# Patient Record
Sex: FEMALE | Race: WHITE | NOT HISPANIC OR LATINO | Employment: FULL TIME | URBAN - METROPOLITAN AREA
[De-identification: names, ages, dates, MRNs, and addresses within clinical notes are randomized per-mention and may not be internally consistent; named-entity substitution may affect disease eponyms.]

---

## 2014-10-15 LAB — PAP SMEAR - HIM PATIENT REPORTED: NORMAL

## 2017-01-09 ENCOUNTER — COMMUNICATION - HEALTHEAST (OUTPATIENT)
Dept: INTERNAL MEDICINE | Facility: CLINIC | Age: 27
End: 2017-01-09

## 2017-01-09 DIAGNOSIS — F90.9 ADHD (ATTENTION DEFICIT HYPERACTIVITY DISORDER): ICD-10-CM

## 2017-02-16 ENCOUNTER — COMMUNICATION - HEALTHEAST (OUTPATIENT)
Dept: INTERNAL MEDICINE | Facility: CLINIC | Age: 27
End: 2017-02-16

## 2017-02-16 DIAGNOSIS — F90.9 ADHD (ATTENTION DEFICIT HYPERACTIVITY DISORDER): ICD-10-CM

## 2017-02-27 ENCOUNTER — RECORDS - HEALTHEAST (OUTPATIENT)
Dept: ADMINISTRATIVE | Facility: OTHER | Age: 27
End: 2017-02-27

## 2017-03-27 ENCOUNTER — COMMUNICATION - HEALTHEAST (OUTPATIENT)
Dept: INTERNAL MEDICINE | Facility: CLINIC | Age: 27
End: 2017-03-27

## 2017-03-27 DIAGNOSIS — F90.9 ADHD (ATTENTION DEFICIT HYPERACTIVITY DISORDER): ICD-10-CM

## 2017-04-06 ENCOUNTER — COMMUNICATION - HEALTHEAST (OUTPATIENT)
Dept: INTERNAL MEDICINE | Facility: CLINIC | Age: 27
End: 2017-04-06

## 2017-04-06 DIAGNOSIS — F90.9 ADHD (ATTENTION DEFICIT HYPERACTIVITY DISORDER): ICD-10-CM

## 2017-05-01 ENCOUNTER — COMMUNICATION - HEALTHEAST (OUTPATIENT)
Dept: INTERNAL MEDICINE | Facility: CLINIC | Age: 27
End: 2017-05-01

## 2017-05-01 DIAGNOSIS — F90.9 ADHD (ATTENTION DEFICIT HYPERACTIVITY DISORDER): ICD-10-CM

## 2017-05-30 ENCOUNTER — COMMUNICATION - HEALTHEAST (OUTPATIENT)
Dept: INTERNAL MEDICINE | Facility: CLINIC | Age: 27
End: 2017-05-30

## 2017-05-30 DIAGNOSIS — F90.9 ADHD (ATTENTION DEFICIT HYPERACTIVITY DISORDER): ICD-10-CM

## 2017-06-05 ENCOUNTER — RECORDS - HEALTHEAST (OUTPATIENT)
Dept: ADMINISTRATIVE | Facility: OTHER | Age: 27
End: 2017-06-05

## 2017-06-08 ENCOUNTER — RECORDS - HEALTHEAST (OUTPATIENT)
Dept: ADMINISTRATIVE | Facility: OTHER | Age: 27
End: 2017-06-08

## 2017-06-27 ENCOUNTER — COMMUNICATION - HEALTHEAST (OUTPATIENT)
Dept: INTERNAL MEDICINE | Facility: CLINIC | Age: 27
End: 2017-06-27

## 2017-06-27 DIAGNOSIS — F90.9 ADHD (ATTENTION DEFICIT HYPERACTIVITY DISORDER): ICD-10-CM

## 2017-06-30 ENCOUNTER — COMMUNICATION - HEALTHEAST (OUTPATIENT)
Dept: INTERNAL MEDICINE | Facility: CLINIC | Age: 27
End: 2017-06-30

## 2017-07-25 ENCOUNTER — HOSPITAL ENCOUNTER (OUTPATIENT)
Dept: MRI IMAGING | Facility: HOSPITAL | Age: 27
Discharge: HOME | End: 2017-07-25
Attending: INTERNAL MEDICINE

## 2017-07-25 ENCOUNTER — OFFICE VISIT - HEALTHEAST (OUTPATIENT)
Dept: INTERNAL MEDICINE | Facility: CLINIC | Age: 27
End: 2017-07-25

## 2017-07-25 DIAGNOSIS — R42 VERTIGO: ICD-10-CM

## 2017-07-25 DIAGNOSIS — R42 LIGHTHEADED: ICD-10-CM

## 2017-07-25 DIAGNOSIS — Z85.818 HISTORY OF PAROTID CANCER: ICD-10-CM

## 2017-07-26 ENCOUNTER — COMMUNICATION - HEALTHEAST (OUTPATIENT)
Dept: INTERNAL MEDICINE | Facility: CLINIC | Age: 27
End: 2017-07-26

## 2017-07-28 ENCOUNTER — RECORDS - HEALTHEAST (OUTPATIENT)
Dept: ADMINISTRATIVE | Facility: OTHER | Age: 27
End: 2017-07-28

## 2017-08-09 ENCOUNTER — COMMUNICATION - HEALTHEAST (OUTPATIENT)
Dept: INTERNAL MEDICINE | Facility: CLINIC | Age: 27
End: 2017-08-09

## 2017-08-09 DIAGNOSIS — F90.9 ADHD (ATTENTION DEFICIT HYPERACTIVITY DISORDER): ICD-10-CM

## 2017-09-13 ENCOUNTER — COMMUNICATION - HEALTHEAST (OUTPATIENT)
Dept: INTERNAL MEDICINE | Facility: CLINIC | Age: 27
End: 2017-09-13

## 2017-09-13 DIAGNOSIS — F90.9 ADHD (ATTENTION DEFICIT HYPERACTIVITY DISORDER): ICD-10-CM

## 2017-10-11 ENCOUNTER — COMMUNICATION - HEALTHEAST (OUTPATIENT)
Dept: INTERNAL MEDICINE | Facility: CLINIC | Age: 27
End: 2017-10-11

## 2017-10-11 DIAGNOSIS — F90.9 ADHD (ATTENTION DEFICIT HYPERACTIVITY DISORDER): ICD-10-CM

## 2017-10-16 ENCOUNTER — COMMUNICATION - HEALTHEAST (OUTPATIENT)
Dept: SCHEDULING | Facility: CLINIC | Age: 27
End: 2017-10-16

## 2017-10-16 ENCOUNTER — OFFICE VISIT - HEALTHEAST (OUTPATIENT)
Dept: INTERNAL MEDICINE | Facility: CLINIC | Age: 27
End: 2017-10-16

## 2017-10-16 DIAGNOSIS — J06.9 URI (UPPER RESPIRATORY INFECTION): ICD-10-CM

## 2017-11-13 ENCOUNTER — COMMUNICATION - HEALTHEAST (OUTPATIENT)
Dept: INTERNAL MEDICINE | Facility: CLINIC | Age: 27
End: 2017-11-13

## 2017-11-13 DIAGNOSIS — F90.9 ADHD (ATTENTION DEFICIT HYPERACTIVITY DISORDER): ICD-10-CM

## 2017-11-15 ENCOUNTER — RECORDS - HEALTHEAST (OUTPATIENT)
Dept: ADMINISTRATIVE | Facility: OTHER | Age: 27
End: 2017-11-15

## 2017-11-16 ENCOUNTER — RECORDS - HEALTHEAST (OUTPATIENT)
Dept: ADMINISTRATIVE | Facility: OTHER | Age: 27
End: 2017-11-16

## 2017-11-16 LAB — PAP SMEAR - HIM PATIENT REPORTED: NORMAL

## 2017-12-12 ENCOUNTER — COMMUNICATION - HEALTHEAST (OUTPATIENT)
Dept: INTERNAL MEDICINE | Facility: CLINIC | Age: 27
End: 2017-12-12

## 2017-12-12 DIAGNOSIS — F90.9 ADHD (ATTENTION DEFICIT HYPERACTIVITY DISORDER): ICD-10-CM

## 2018-01-16 ENCOUNTER — COMMUNICATION - HEALTHEAST (OUTPATIENT)
Dept: INTERNAL MEDICINE | Facility: CLINIC | Age: 28
End: 2018-01-16

## 2018-01-16 DIAGNOSIS — F90.9 ADHD (ATTENTION DEFICIT HYPERACTIVITY DISORDER): ICD-10-CM

## 2018-01-29 ENCOUNTER — COMMUNICATION - HEALTHEAST (OUTPATIENT)
Dept: SCHEDULING | Facility: CLINIC | Age: 28
End: 2018-01-29

## 2018-01-29 DIAGNOSIS — J11.1 INFLUENZA: ICD-10-CM

## 2018-02-15 ENCOUNTER — COMMUNICATION - HEALTHEAST (OUTPATIENT)
Dept: INTERNAL MEDICINE | Facility: CLINIC | Age: 28
End: 2018-02-15

## 2018-02-15 DIAGNOSIS — F90.9 ADHD (ATTENTION DEFICIT HYPERACTIVITY DISORDER): ICD-10-CM

## 2018-03-12 ENCOUNTER — AMBULATORY - HEALTHEAST (OUTPATIENT)
Dept: INTERNAL MEDICINE | Facility: CLINIC | Age: 28
End: 2018-03-12

## 2018-03-12 ENCOUNTER — COMMUNICATION - HEALTHEAST (OUTPATIENT)
Dept: INTERNAL MEDICINE | Facility: CLINIC | Age: 28
End: 2018-03-12

## 2018-03-12 DIAGNOSIS — F90.9 ADHD (ATTENTION DEFICIT HYPERACTIVITY DISORDER): ICD-10-CM

## 2018-03-19 ENCOUNTER — OFFICE VISIT - HEALTHEAST (OUTPATIENT)
Dept: INTERNAL MEDICINE | Facility: CLINIC | Age: 28
End: 2018-03-19

## 2018-03-19 DIAGNOSIS — F90.9 ADHD (ATTENTION DEFICIT HYPERACTIVITY DISORDER): ICD-10-CM

## 2018-03-19 ASSESSMENT — MIFFLIN-ST. JEOR: SCORE: 1396.74

## 2018-04-18 ENCOUNTER — COMMUNICATION - HEALTHEAST (OUTPATIENT)
Dept: INTERNAL MEDICINE | Facility: CLINIC | Age: 28
End: 2018-04-18

## 2018-05-18 ENCOUNTER — COMMUNICATION - HEALTHEAST (OUTPATIENT)
Dept: INTERNAL MEDICINE | Facility: CLINIC | Age: 28
End: 2018-05-18

## 2018-06-18 ENCOUNTER — COMMUNICATION - HEALTHEAST (OUTPATIENT)
Dept: INTERNAL MEDICINE | Facility: CLINIC | Age: 28
End: 2018-06-18

## 2018-07-12 ENCOUNTER — COMMUNICATION - HEALTHEAST (OUTPATIENT)
Dept: INTERNAL MEDICINE | Facility: CLINIC | Age: 28
End: 2018-07-12

## 2018-08-14 ENCOUNTER — COMMUNICATION - HEALTHEAST (OUTPATIENT)
Dept: INTERNAL MEDICINE | Facility: CLINIC | Age: 28
End: 2018-08-14

## 2018-08-23 ENCOUNTER — COMMUNICATION - HEALTHEAST (OUTPATIENT)
Dept: SCHEDULING | Facility: CLINIC | Age: 28
End: 2018-08-23

## 2018-08-23 ENCOUNTER — OFFICE VISIT - HEALTHEAST (OUTPATIENT)
Dept: INTERNAL MEDICINE | Facility: CLINIC | Age: 28
End: 2018-08-23

## 2018-08-23 DIAGNOSIS — H81.13 BENIGN PAROXYSMAL POSITIONAL VERTIGO, BILATERAL: ICD-10-CM

## 2018-08-23 ASSESSMENT — MIFFLIN-ST. JEOR: SCORE: 1364.98

## 2018-08-24 ENCOUNTER — RECORDS - HEALTHEAST (OUTPATIENT)
Dept: ADMINISTRATIVE | Facility: OTHER | Age: 28
End: 2018-08-24

## 2018-08-27 ENCOUNTER — RECORDS - HEALTHEAST (OUTPATIENT)
Dept: ADMINISTRATIVE | Facility: OTHER | Age: 28
End: 2018-08-27

## 2018-09-12 ENCOUNTER — COMMUNICATION - HEALTHEAST (OUTPATIENT)
Dept: INTERNAL MEDICINE | Facility: CLINIC | Age: 28
End: 2018-09-12

## 2018-10-15 ENCOUNTER — COMMUNICATION - HEALTHEAST (OUTPATIENT)
Dept: INTERNAL MEDICINE | Facility: CLINIC | Age: 28
End: 2018-10-15

## 2018-11-01 ENCOUNTER — RECORDS - HEALTHEAST (OUTPATIENT)
Dept: ADMINISTRATIVE | Facility: OTHER | Age: 28
End: 2018-11-01

## 2018-11-06 ENCOUNTER — RECORDS - HEALTHEAST (OUTPATIENT)
Dept: ADMINISTRATIVE | Facility: OTHER | Age: 28
End: 2018-11-06

## 2018-11-12 ENCOUNTER — COMMUNICATION - HEALTHEAST (OUTPATIENT)
Dept: INTERNAL MEDICINE | Facility: CLINIC | Age: 28
End: 2018-11-12

## 2018-11-19 ENCOUNTER — OFFICE VISIT - HEALTHEAST (OUTPATIENT)
Dept: INTERNAL MEDICINE | Facility: CLINIC | Age: 28
End: 2018-11-19

## 2018-11-19 DIAGNOSIS — J40 BRONCHITIS: ICD-10-CM

## 2018-11-19 ASSESSMENT — MIFFLIN-ST. JEOR: SCORE: 1378.59

## 2018-11-29 ENCOUNTER — COMMUNICATION - HEALTHEAST (OUTPATIENT)
Dept: INTERNAL MEDICINE | Facility: CLINIC | Age: 28
End: 2018-11-29

## 2018-12-13 ENCOUNTER — COMMUNICATION - HEALTHEAST (OUTPATIENT)
Dept: INTERNAL MEDICINE | Facility: CLINIC | Age: 28
End: 2018-12-13

## 2019-01-18 ENCOUNTER — COMMUNICATION - HEALTHEAST (OUTPATIENT)
Dept: INTERNAL MEDICINE | Facility: CLINIC | Age: 29
End: 2019-01-18

## 2019-02-20 ENCOUNTER — COMMUNICATION - HEALTHEAST (OUTPATIENT)
Dept: INTERNAL MEDICINE | Facility: CLINIC | Age: 29
End: 2019-02-20

## 2019-03-19 ENCOUNTER — COMMUNICATION - HEALTHEAST (OUTPATIENT)
Dept: INTERNAL MEDICINE | Facility: CLINIC | Age: 29
End: 2019-03-19

## 2019-04-22 ENCOUNTER — COMMUNICATION - HEALTHEAST (OUTPATIENT)
Dept: INTERNAL MEDICINE | Facility: CLINIC | Age: 29
End: 2019-04-22

## 2019-04-22 DIAGNOSIS — F90.9 ATTENTION DEFICIT HYPERACTIVITY DISORDER (ADHD), UNSPECIFIED ADHD TYPE: ICD-10-CM

## 2019-05-23 ENCOUNTER — COMMUNICATION - HEALTHEAST (OUTPATIENT)
Dept: INTERNAL MEDICINE | Facility: CLINIC | Age: 29
End: 2019-05-23

## 2019-05-23 DIAGNOSIS — F90.9 ATTENTION DEFICIT HYPERACTIVITY DISORDER (ADHD), UNSPECIFIED ADHD TYPE: ICD-10-CM

## 2019-06-06 ENCOUNTER — OFFICE VISIT - HEALTHEAST (OUTPATIENT)
Dept: INTERNAL MEDICINE | Facility: CLINIC | Age: 29
End: 2019-06-06

## 2019-06-06 DIAGNOSIS — J40 BRONCHITIS: ICD-10-CM

## 2019-06-14 ENCOUNTER — RECORDS - HEALTHEAST (OUTPATIENT)
Dept: HEALTH INFORMATION MANAGEMENT | Facility: CLINIC | Age: 29
End: 2019-06-14

## 2019-06-17 ENCOUNTER — COMMUNICATION - HEALTHEAST (OUTPATIENT)
Dept: INTERNAL MEDICINE | Facility: CLINIC | Age: 29
End: 2019-06-17

## 2019-06-17 DIAGNOSIS — F90.9 ATTENTION DEFICIT HYPERACTIVITY DISORDER (ADHD), UNSPECIFIED ADHD TYPE: ICD-10-CM

## 2019-07-01 ENCOUNTER — OFFICE VISIT - HEALTHEAST (OUTPATIENT)
Dept: INTERNAL MEDICINE | Facility: CLINIC | Age: 29
End: 2019-07-01

## 2019-07-01 DIAGNOSIS — H65.03 BILATERAL ACUTE SEROUS OTITIS MEDIA, RECURRENCE NOT SPECIFIED: ICD-10-CM

## 2019-07-01 DIAGNOSIS — R07.0 THROAT PAIN: ICD-10-CM

## 2019-07-01 LAB — DEPRECATED S PYO AG THROAT QL EIA: NORMAL

## 2019-07-02 LAB — GROUP A STREP BY PCR: NORMAL

## 2019-07-15 ENCOUNTER — COMMUNICATION - HEALTHEAST (OUTPATIENT)
Dept: INTERNAL MEDICINE | Facility: CLINIC | Age: 29
End: 2019-07-15

## 2019-07-15 DIAGNOSIS — F90.9 ATTENTION DEFICIT HYPERACTIVITY DISORDER (ADHD), UNSPECIFIED ADHD TYPE: ICD-10-CM

## 2019-08-12 ENCOUNTER — COMMUNICATION - HEALTHEAST (OUTPATIENT)
Dept: INTERNAL MEDICINE | Facility: CLINIC | Age: 29
End: 2019-08-12

## 2019-08-12 DIAGNOSIS — F90.9 ATTENTION DEFICIT HYPERACTIVITY DISORDER (ADHD), UNSPECIFIED ADHD TYPE: ICD-10-CM

## 2019-09-11 ENCOUNTER — COMMUNICATION - HEALTHEAST (OUTPATIENT)
Dept: INTERNAL MEDICINE | Facility: CLINIC | Age: 29
End: 2019-09-11

## 2019-09-11 DIAGNOSIS — F90.9 ATTENTION DEFICIT HYPERACTIVITY DISORDER (ADHD), UNSPECIFIED ADHD TYPE: ICD-10-CM

## 2019-09-19 ENCOUNTER — OFFICE VISIT - HEALTHEAST (OUTPATIENT)
Dept: INTERNAL MEDICINE | Facility: CLINIC | Age: 29
End: 2019-09-19

## 2019-09-19 DIAGNOSIS — B00.9 RECURRENT HERPES SIMPLEX: ICD-10-CM

## 2019-09-19 DIAGNOSIS — F90.9 ATTENTION DEFICIT HYPERACTIVITY DISORDER (ADHD), UNSPECIFIED ADHD TYPE: ICD-10-CM

## 2019-09-19 ASSESSMENT — MIFFLIN-ST. JEOR: SCORE: 1387.66

## 2019-10-21 ENCOUNTER — COMMUNICATION - HEALTHEAST (OUTPATIENT)
Dept: INTERNAL MEDICINE | Facility: CLINIC | Age: 29
End: 2019-10-21

## 2019-10-21 DIAGNOSIS — F90.9 ATTENTION DEFICIT HYPERACTIVITY DISORDER (ADHD), UNSPECIFIED ADHD TYPE: ICD-10-CM

## 2019-11-21 ENCOUNTER — COMMUNICATION - HEALTHEAST (OUTPATIENT)
Dept: INTERNAL MEDICINE | Facility: CLINIC | Age: 29
End: 2019-11-21

## 2019-11-21 DIAGNOSIS — F90.9 ATTENTION DEFICIT HYPERACTIVITY DISORDER (ADHD), UNSPECIFIED ADHD TYPE: ICD-10-CM

## 2019-12-23 ENCOUNTER — COMMUNICATION - HEALTHEAST (OUTPATIENT)
Dept: INTERNAL MEDICINE | Facility: CLINIC | Age: 29
End: 2019-12-23

## 2019-12-23 DIAGNOSIS — F90.9 ATTENTION DEFICIT HYPERACTIVITY DISORDER (ADHD), UNSPECIFIED ADHD TYPE: ICD-10-CM

## 2020-01-20 ENCOUNTER — COMMUNICATION - HEALTHEAST (OUTPATIENT)
Dept: INTERNAL MEDICINE | Facility: CLINIC | Age: 30
End: 2020-01-20

## 2020-01-20 DIAGNOSIS — F90.9 ATTENTION DEFICIT HYPERACTIVITY DISORDER (ADHD), UNSPECIFIED ADHD TYPE: ICD-10-CM

## 2020-02-18 ENCOUNTER — COMMUNICATION - HEALTHEAST (OUTPATIENT)
Dept: INTERNAL MEDICINE | Facility: CLINIC | Age: 30
End: 2020-02-18

## 2020-02-18 DIAGNOSIS — F90.9 ATTENTION DEFICIT HYPERACTIVITY DISORDER (ADHD), UNSPECIFIED ADHD TYPE: ICD-10-CM

## 2020-02-28 ENCOUNTER — AMBULATORY - HEALTHEAST (OUTPATIENT)
Dept: INTERNAL MEDICINE | Facility: CLINIC | Age: 30
End: 2020-02-28

## 2020-02-28 ENCOUNTER — COMMUNICATION - HEALTHEAST (OUTPATIENT)
Dept: SCHEDULING | Facility: CLINIC | Age: 30
End: 2020-02-28

## 2020-02-28 ENCOUNTER — OFFICE VISIT - HEALTHEAST (OUTPATIENT)
Dept: INTERNAL MEDICINE | Facility: CLINIC | Age: 30
End: 2020-02-28

## 2020-02-28 DIAGNOSIS — N39.0 URINARY TRACT INFECTION WITHOUT HEMATURIA, SITE UNSPECIFIED: ICD-10-CM

## 2020-02-28 DIAGNOSIS — R19.7 ACUTE DIARRHEA: ICD-10-CM

## 2020-02-28 DIAGNOSIS — R39.9 UTI SYMPTOMS: ICD-10-CM

## 2020-02-28 LAB
ALBUMIN UR-MCNC: NEGATIVE MG/DL
APPEARANCE UR: CLEAR
BILIRUB UR QL STRIP: NEGATIVE
COLOR UR AUTO: YELLOW
GLUCOSE UR STRIP-MCNC: NEGATIVE MG/DL
HGB UR QL STRIP: NEGATIVE
KETONES UR STRIP-MCNC: NEGATIVE MG/DL
LEUKOCYTE ESTERASE UR QL STRIP: NEGATIVE
NITRATE UR QL: NEGATIVE
PH UR STRIP: 5.5 [PH] (ref 5–8)
SP GR UR STRIP: <=1.005 (ref 1–1.03)
UROBILINOGEN UR STRIP-ACNC: NORMAL

## 2020-02-28 ASSESSMENT — MIFFLIN-ST. JEOR: SCORE: 1387.66

## 2020-03-16 ENCOUNTER — COMMUNICATION - HEALTHEAST (OUTPATIENT)
Dept: INTERNAL MEDICINE | Facility: CLINIC | Age: 30
End: 2020-03-16

## 2020-03-16 DIAGNOSIS — F90.9 ATTENTION DEFICIT HYPERACTIVITY DISORDER (ADHD), UNSPECIFIED ADHD TYPE: ICD-10-CM

## 2020-04-07 ENCOUNTER — VIRTUAL VISIT (OUTPATIENT)
Dept: FAMILY MEDICINE | Facility: OTHER | Age: 30
End: 2020-04-07

## 2020-04-07 NOTE — PROGRESS NOTES
"Date: 2020 13:22:14  Clinician: Bethany Linares  Clinician NPI: [id number]  Patient: Milka Jules  Patient : 1990  Patient Address: [address] [place] [address]  Patient Phone: [phone]  Visit Protocol: URI  Patient Summary:  Milka is a 29 year old ( : 1990 ) female who initiated a Visit for COVID-19 (Coronavirus) evaluation and screening. When asked the question \"Please sign me up to receive news, health information and promotions from [employer].\", Milka responded \"No\".    Milka states her symptoms started gradually 3-6 days ago. After her symptoms started, they improved and then got worse again.   Her symptoms consist of facial pain or pressure, myalgia, a cough, malaise, ear pain, chills, and a headache. She is experiencing mild difficulty breathing with activities but can speak normally in full sentences. Milka also feels feverish but was unable to measure her temperature.   Symptom details     Cough: Milka coughs a few times an hour and her cough is more bothersome at night. Phlegm does not come into her throat when she coughs. She believes her cough is caused by post-nasal drip.     Facial pain or pressure: The facial pain or pressure feels worse when bending over or leaning forward.     Headache: She states the headache is moderate (4-6 on a 10 point pain scale).      Milka denies having rhinitis, sore throat, nasal congestion, diarrhea, vomiting, nausea, teeth pain, and wheezing. She also denies taking antibiotic medication for the symptoms, having recent facial or sinus surgery in the past 60 days, and having a sinus infection within the past year.   Precipitating events  She has not recently been exposed to someone with influenza. Milka has not been in close contact with any high risk individuals.   Pertinent COVID-19 (Coronavirus) information  Milka has not traveled internationally or to the areas where COVID-19 (Coronavirus) is " widespread, including cruise ship travel in the last 14 days before the start of her symptoms.   Milka has not had a close contact with a laboratory-confirmed COVID-19 patient within 14 days of symptom onset. She also has not had a close contact with a suspected COVID-19 patient within 14 days of symptom onset.   Milka does not work or volunteer as healthcare worker or a  and does not work or volunteer in a healthcare facility. She does not live with a healthcare worker.   Pertinent medical history  Milka does not get yeast infections when she takes antibiotics.   Milka does not need a return to work/school note.   Weight: 125 lbs   Milka does not smoke or use smokeless tobacco.   She denies pregnancy and denies breastfeeding. She has menstruated in the past month.   Additional information as reported by the patient (free text): Chills, chest pressure, shortness breath, headache, fatigue.   Weight: 125 lbs    MEDICATIONS: No current medications, ALLERGIES: NKDA  Clinician Response:  Dear Milka,   Based on the information you have provided, you do have symptoms that are consistent with Coronavirus (COVID-19).  The coronavirus causes mild to severe respiratory illness with the most common symptoms including fever, cough and difficulty breathing. Unfortunately, many viruses cause similar symptoms and it can be difficult to distinguish between viruses, especially in mild cases, so we are presuming that anyone with cough or fever has coronavirus at this time.  Coronavirus/COVID-19 has reached the point of community spread in [place], meaning that we are finding the virus in people with no known exposure risk for nu the virus. Given the increasing commonness of coronavirus in the community we are no longer testing patients who are not critically ill.  If you are a health care worker, you should refer to your employee health office for instructions about testing and returning to  work.  For everyone else who has cough or fever, you should assume you are infected with coronavirus. Since you will not be tested but have symptoms that may be consistent with coronavirus, the CDC recommends you stay in self-isolation until these three things have happened:    You have had no fever for at least 72 hours (that is three full days of no fever without the use of medicine that reduces fevers)    AND   Other symptoms have improved (for example, when your cough or shortness of breath have improved)   AND   At least 7 days have passed since your symptoms first appeared.   How to Isolate:   Isolate yourself at home.  Do Not allow any visitors  Do Not go to work or school  Do Not go to [employer],  centers, shopping, or other public places.  Do Not shake hands.  Avoid close contact with others (hugging, kissing).   Protect Others:   Cover Your Mouth and Nose with a mask, disposable tissue or wash cloth to avoid spreading germs to others.  Wash your hands and face frequently with soap and water.   We know it can be scary to hear that you might have COVID-19. Our team can help track your symptoms and make sure you are doing ok over the next two weeks using a program called Instant Information to keep in touch. When you receive an email from Instant Information, please consider enrolling in our monitoring program. There is no cost to you for monitoring. Here is a URL where you can learn more: [email or web address]  Managing Symptoms:   At this time, we primarily recommend Tylenol (Acetaminophen) for fever or pain. If you have liver or kidney problems, contact your primary care provider for instructions on use of tylenol. Adults can take 650 mg (two 325 mg pills) by mouth every 4-6 hours as needed OR 1,000 mg (two 500 mg pills) every 8 hours as needed. MAXIMUM DAILY DOSE: 3,000mg. For children, refer to dosing on bottle based on age or weight.   If you develop significant shortness of breath that prevents  you from doing normal activities, please call 911 or proceed to the nearest emergency room and alert them immediately that you have been in self-isolation for possible coronavirus.  If you have a higher risk medical condition such as cancer, heart failure, end stage renal disease on dialysis or have a transplant, please reach out to your specialist's clinic to advise them of your [facility] visit should you not improve within the next two days.   For more information about COVID19 and options for caring for yourself at home, please visit the CDC website at https://www.cdc.gov/coronavirus/2019-ncov/about/steps-when-sick.htmlFor more options for care at [facility], please visit our website at [email or web address]    Diagnosis: Cough  Diagnosis ICD: R05  Additional Clinician Notes: Hi Milka,    it is likely that you are still suffering from a viral upper respiratory infection, possibly consistent with COVID-19. Will send an albuterol inhaler to help with the chest heaviness and shortness of breath. Please ask the pharmacist for tips on use if you are not familiar with inhalers. I also registered you for the Get[employer] Loop that is free to use and can monitor your symptoms going forward to make sure things continue to improve.  Prescription: albuterol sulfate (Proventil HFA) 90 mcg/actuation inhalation HFA aerosol inhaler 1 200 inhalation aerosol with adapter (proventil hfa or equivalent), 0 days supply. Inhale 2 puffs every 4 hours as needed. Refills: 0, Refill as needed: no, Allow substitutions: yes  Pharmacy: [id number] - [phone] - [address], [place] [address]

## 2020-04-23 ENCOUNTER — COMMUNICATION - HEALTHEAST (OUTPATIENT)
Dept: INTERNAL MEDICINE | Facility: CLINIC | Age: 30
End: 2020-04-23

## 2020-04-23 DIAGNOSIS — F90.9 ATTENTION DEFICIT HYPERACTIVITY DISORDER (ADHD), UNSPECIFIED ADHD TYPE: ICD-10-CM

## 2020-04-29 ENCOUNTER — NURSE TRIAGE (OUTPATIENT)
Dept: NURSING | Facility: CLINIC | Age: 30
End: 2020-04-29

## 2020-04-29 ENCOUNTER — ANCILLARY PROCEDURE (OUTPATIENT)
Dept: GENERAL RADIOLOGY | Facility: CLINIC | Age: 30
End: 2020-04-29
Attending: NURSE PRACTITIONER
Payer: COMMERCIAL

## 2020-04-29 ENCOUNTER — OFFICE VISIT (OUTPATIENT)
Dept: URGENT CARE | Facility: URGENT CARE | Age: 30
End: 2020-04-29
Payer: COMMERCIAL

## 2020-04-29 ENCOUNTER — VIRTUAL VISIT (OUTPATIENT)
Dept: FAMILY MEDICINE | Facility: OTHER | Age: 30
End: 2020-04-29

## 2020-04-29 VITALS
DIASTOLIC BLOOD PRESSURE: 70 MMHG | TEMPERATURE: 98.8 F | OXYGEN SATURATION: 99 % | HEART RATE: 92 BPM | SYSTOLIC BLOOD PRESSURE: 128 MMHG | WEIGHT: 125 LBS

## 2020-04-29 DIAGNOSIS — J22 LOWER RESPIRATORY INFECTION: Primary | ICD-10-CM

## 2020-04-29 DIAGNOSIS — R05.9 COUGH: ICD-10-CM

## 2020-04-29 DIAGNOSIS — R06.02 SHORTNESS OF BREATH: ICD-10-CM

## 2020-04-29 PROBLEM — F90.9 ADHD (ATTENTION DEFICIT HYPERACTIVITY DISORDER): Status: ACTIVE | Noted: 2020-04-29

## 2020-04-29 PROBLEM — H81.13 BENIGN PAROXYSMAL POSITIONAL VERTIGO, BILATERAL: Status: ACTIVE | Noted: 2018-08-23

## 2020-04-29 PROCEDURE — 71046 X-RAY EXAM CHEST 2 VIEWS: CPT

## 2020-04-29 PROCEDURE — 99203 OFFICE O/P NEW LOW 30 MIN: CPT | Performed by: NURSE PRACTITIONER

## 2020-04-29 RX ORDER — AZITHROMYCIN 250 MG/1
TABLET, FILM COATED ORAL
Qty: 6 TABLET | Refills: 0 | Status: SHIPPED | OUTPATIENT
Start: 2020-04-29 | End: 2020-05-04

## 2020-04-29 ASSESSMENT — ENCOUNTER SYMPTOMS
LIGHT-HEADEDNESS: 1
SORE THROAT: 0
ABDOMINAL PAIN: 0
COUGH: 1
FATIGUE: 1
NAUSEA: 0
MYALGIAS: 1
RHINORRHEA: 0
DIARRHEA: 1
VOMITING: 0
FEVER: 0
HEADACHES: 1
SHORTNESS OF BREATH: 1

## 2020-04-29 NOTE — TELEPHONE ENCOUNTER
"April 7 pt started to have symptoms, did [email or web address]    Went to [email or web address] today, was advised from Dr. Black at on[facility]care visit from 4/29/20:  \"Due to persistent significant symptoms and failure to improve, I suggest that you make a visit to one of the Urgent Care Clinics for further evaluation. [facility] may be near your location. \"    Pt just calling to set up visit with the urgent care.     Pt transferred to scheduling to make appointment.     Jaylene Crews RN   [facility] RN Triage       Reason for Disposition    [1] Follow-up call to recent contact AND [2] information only call, no triage required    Protocols used: INFORMATION ONLY CALL-A-AH      "

## 2020-04-29 NOTE — PROGRESS NOTES
"Date: 2020 13:46:29  Clinician: Thanh Black  Clinician NPI: [id number]  Patient: Milka Jules  Patient : 1990  Patient Address: [address] [place] [address]  Patient Phone: [phone]  Visit Protocol: URI  Patient Summary:  Milka is a 29 year old ( : 1990 ) female who initiated a Visit for COVID-19 (Coronavirus) evaluation and screening. When asked the question \"Please sign me up to receive news, health information and promotions from [employer].\", Milka responded \"No\".    Milka states her symptoms started gradually 1 month or more ago. After her symptoms started, they improved and then got worse again.   Her symptoms consist of malaise, myalgia, a cough, ageusia, anosmia, diarrhea, ear pain, a headache, and chills. Milka also feels feverish but was unable to measure her temperature.   Symptom details     Cough: Milka coughs every 5-10 minutes and her cough is more bothersome at night. Phlegm does not come into her throat when she coughs. She does not believe her cough is caused by post-nasal drip.     Headache: Milka has not had the headache for 12 weeks or more. She states the headache is moderate (4-6 on a 10 point pain scale).      Milka denies having rhinitis, facial pain or pressure, sore throat, nasal congestion, vomiting, nausea, teeth pain, wheezing, and enlarged lymph nodes. She also denies taking antibiotic medication for the symptoms, having recent facial or sinus surgery in the past 60 days, and having a sinus infection within the past year.   Precipitating events  She has not recently been exposed to someone with influenza. Milka has not been in close contact with any high risk individuals.   Pertinent COVID-19 (Coronavirus) information  Milka does not work or volunteer as healthcare worker or a  and does not work or volunteer in a healthcare facility.   She does not live with a healthcare worker.   Milka has not had a " close contact with a laboratory-confirmed COVID-19 patient within 14 days of symptom onset. She also has not had a close contact with a suspected COVID-19 patient within 14 days of symptom onset.   Triage Point(s) temporarily suspended for COVID-19 (Coronavirus) screening  Milka reported the following symptoms which were previously protocol referral points. These protocol referral points have temporarily been removed for purposes of COVID-19 (Coronavirus) screening.   Difficulty breathing even when resting and can only speak in phrase(s)   Pertinent medical history  Milka typically gets a yeast infection when she takes antibiotics. She has not used fluconazole (Diflucan) to treat previous yeast infections.   Milka does not need a return to work/school note.   Weight: 125 lbs   Milka does not smoke or use smokeless tobacco.   She denies pregnancy and denies breastfeeding. She has menstruated in the past month.   Additional information as reported by the patient (free text): About a month ago (April 2), the first wave of symptoms struck (head/body ache, fatigue, slight fever).  It lasted for about two and a half days and then almost completely subsided. A couple days later, it reared its ugly head again---and got a lot worse. Along with the above symptoms, came intense chest pain and pressure (definitely the worst of my symptoms), inability to taste or smell, difficulty breathing, and a cough. It got better again over the last week and is now much worse again thisweek   Weight: 125 lbs  A synchronous phone visit was initiated by the provider for the following reason: More info on her breathing problem and her medical history.    MEDICATIONS: No current medications, ALLERGIES: NKDA  Clinician Response:  Dear Milka,      Due to persistent significant symptoms and failure to improve, I suggest that you make a visit to one of the Urgent Care Clinics for further evaluation.     [facility] may be  near your location.     I enclose covid information.             COVID-19 (Coronavirus) General Information  Because there is currently no vaccine to prevent infection, the best way to protect yourself is to avoid being exposed to this virus. Common symptoms of COVID-19 include but are not limited to fever, cough, and shortness of breath. These symptoms appear 2-14 days after you are exposed to the virus that causes COVID-19. Click here for more information from the CDC on how to protect yourself.  If you are sick with COVID-19 or suspect you are infected with the virus that causes COVID-19, follow the steps here from the CDC to help prevent the disease from spreading to people in your home and community.  Click here for general information from the CDC on testing.  If you develop any of these emergency warning signs for COVID-19, get medical attention immediately:     Trouble breathing    Persistent pain or pressure in the chest    New confusion or inability to arouse    Bluish lips or face      Call your doctor or clinic before going in. Call 911 if you have a medical emergency and notify the  you have or think you may have COVID-19.  For more detailed and up to date information on COVID-19 (Coronavirus), please visit the CDC website.   Diagnosis: Shortness of breath  Diagnosis ICD: R06.02  Triage Notes: I reviewed the patient's history, verified their identity, and explained the Visit process.    Symptomatic for 4 weeks.    Now with continued chest pressure, SOB.    Using an inhaler.    No high risk medical condition.  Synchronous Triage: phone, status: completed, duration: 931 seconds

## 2020-04-30 NOTE — PROGRESS NOTES
SUBJECTIVE:   Milka Jules is a 29 year old female presenting with a chief complaint of   Chief Complaint   Patient presents with     Cough     Shortness of Breath     Patient states that she initially started with headache, body aches and fatigue on April 3, 2020. States symptoms lasted about 2 days and then subsided. Three days after that symptoms returned along with noticeable chest pressure, shortness of breath, increased heart rate and inability to taste. She did complete a virtual visit on 4/7/2020- was told to monitor symptoms and self isolate. Was given albuterol inhaler as needed. States didn't see a ton of improvement with the albuterol. She states she was feeling better starting a couple days ago and then today symptoms seemed to return again. Completed a virtual visit and was told to be seen in clinic for evaluation.     URI Adult    Onset of symptoms was 4 week(s) ago.  Course of illness is worsening after improvement. .    Severity moderate  Current and Associated symptoms: see ROS.   Treatment measures tried include tylenol, zinc, vitamin C.  Predisposing factors include None.      Review of Systems   Constitutional: Positive for fatigue. Negative for fever.   HENT: Positive for ear pain. Negative for congestion, rhinorrhea and sore throat.    Eyes: Negative for visual disturbance.   Respiratory: Positive for cough and shortness of breath.    Cardiovascular: Negative for chest pain.   Gastrointestinal: Positive for diarrhea (intermittent). Negative for abdominal pain, nausea and vomiting.   Musculoskeletal: Positive for myalgias.   Skin: Negative for rash.   Neurological: Positive for light-headedness and headaches.       No past medical history on file.  No family history on file.  Current Outpatient Medications   Medication Sig Dispense Refill     azithromycin (ZITHROMAX) 250 MG tablet Take 2 tablets (500 mg) by mouth daily for 1 day, THEN 1 tablet (250 mg) daily for 4 days. 6 tablet 0      Social History     Tobacco Use     Smoking status: Never Smoker     Smokeless tobacco: Never Used   Substance Use Topics     Alcohol use: Yes     Comment: socially       OBJECTIVE  /70 (BP Location: Left arm, Patient Position: Sitting, Cuff Size: Adult Regular)   Pulse 92   Temp 98.8  F (37.1  C) (Oral)   Wt 56.7 kg (125 lb)   SpO2 99%     Physical Exam  Vitals signs and nursing note reviewed.   Constitutional:       Appearance: Normal appearance. She is well-developed.   HENT:      Head: Normocephalic and atraumatic.      Right Ear: Tympanic membrane, ear canal and external ear normal.      Left Ear: Tympanic membrane, ear canal and external ear normal.      Nose: Nose normal. No congestion or rhinorrhea.      Right Sinus: No maxillary sinus tenderness or frontal sinus tenderness.      Left Sinus: No maxillary sinus tenderness or frontal sinus tenderness.      Mouth/Throat:      Pharynx: Oropharynx is clear. No oropharyngeal exudate or posterior oropharyngeal erythema.   Eyes:      Extraocular Movements: Extraocular movements intact.      Conjunctiva/sclera: Conjunctivae normal.      Pupils: Pupils are equal, round, and reactive to light.   Neck:      Musculoskeletal: Normal range of motion and neck supple.   Cardiovascular:      Rate and Rhythm: Normal rate and regular rhythm.      Heart sounds: Normal heart sounds.   Pulmonary:      Effort: Pulmonary effort is normal.      Breath sounds: Normal breath sounds and air entry.      Comments: Able to speak in full sentences and carry on a conversation without difficulty.   Lymphadenopathy:      Head:      Right side of head: No submandibular or tonsillar adenopathy.      Left side of head: No submandibular or tonsillar adenopathy.      Cervical: No cervical adenopathy.   Skin:     General: Skin is warm and dry.   Neurological:      Mental Status: She is alert and oriented to person, place, and time.   Psychiatric:         Behavior: Behavior is  cooperative.         X-Ray was done, my findings are: no acute cardiopulmonary findings.     ASSESSMENT:      ICD-10-CM    1. Lower respiratory infection  J22 azithromycin (ZITHROMAX) 250 MG tablet   2. Cough  R05 XR Chest 2 Views   3. Shortness of breath  R06.02 XR Chest 2 Views        Medical Decision Making:    Differential Diagnosis:  URI Adult/Peds:  Bronchitis-viral, Pneumonia and Viral upper respiratory illness    Serious Comorbid Conditions:  Adult:  None    PLAN:  Discussed with patient that no acute cardiopulmonary findings noted on xray. Given length of time of symptoms plus worsening after improvement concern for a secondary bacterial infection. Will treat with zithromax. Can continue with the albuterol as needed. Additional symptomatic treatment recommendations discussed. Education was added to AVS. Patient was agreeable to plan and verbalized understanding.     Followup:    If not improving in 1 week or if condition worsens, follow up with your Primary Care Provider    Patient Instructions   Albuterol as needed for cough, wheeze, or shortness of breath.  Zithromax as prescribed  Push Fluids  Plenty of rest  Tylenol and ibuprofen to help with pain or fever  Humidified air can help with congestion  Nasal saline or Flonase nasal spray to help with congestion  Salt water gargles, anesthetic throat spray, or lozenges to help with sore throat.

## 2020-04-30 NOTE — PATIENT INSTRUCTIONS
Albuterol as needed for cough, wheeze, or shortness of breath.  Zithromax as prescribed  Push Fluids  Plenty of rest  Tylenol and ibuprofen to help with pain or fever  Humidified air can help with congestion  Nasal saline or Flonase nasal spray to help with congestion  Salt water gargles, anesthetic throat spray, or lozenges to help with sore throat.

## 2020-05-07 ENCOUNTER — VIRTUAL VISIT (OUTPATIENT)
Dept: FAMILY MEDICINE | Facility: OTHER | Age: 30
End: 2020-05-07

## 2020-05-07 NOTE — PROGRESS NOTES
"Date: 2020 17:15:46  Clinician: Helen Angulo  Clinician NPI: [id number]  Patient: Milka Jules  Patient : 1990  Patient Address: [address] [place] [address]  Patient Phone: [phone]  Visit Protocol: URI  Patient Summary:  Milka is a 29 year old ( : 1990 ) female who initiated a Visit for COVID-19 (Coronavirus) evaluation and screening. When asked the question \"Please sign me up to receive news, health information and promotions from [employer].\", Milka responded \"Yes\".    Milka states her symptoms started gradually 1 month or more ago. After her symptoms started, they improved and then got worse again.   Her symptoms consist of myalgia, facial pain or pressure, a cough, tooth pain, ageusia, a headache, malaise, and chills. She is experiencing mild difficulty breathing with activities but can speak normally in full sentences. Milka also feels feverish but was unable to measure her temperature.   Symptom details     Cough: Milka coughs a few times an hour and her cough is more bothersome at night. Phlegm does not come into her throat when she coughs. She does not believe her cough is caused by post-nasal drip.     Facial pain or pressure: She has not had the facial pain or pressure for 12 weeks or more. The facial pain or pressure feels worse when bending over or leaning forward.     Headache: Milka has not had the headache for 12 weeks or more. She states the headache is moderate (4-6 on a 10 point pain scale).     Tooth pain: The tooth pain is not caused by a cavity, recent dental work, or other mouth problems.      Milka denies having rhinitis, sore throat, nasal congestion, vomiting, nausea, anosmia, diarrhea, ear pain, and wheezing. She also denies having a sinus infection within the past year and having recent facial or sinus surgery in the past 60 days.   Precipitating events  She has not recently been exposed to someone with influenza. Milka " has not been in close contact with any high risk individuals.   Pertinent COVID-19 (Coronavirus) information  Milka does not work or volunteer as healthcare worker or a  and does not work or volunteer in a healthcare facility.   She does not live with a healthcare worker.   Milka has not had a close contact with a laboratory-confirmed COVID-19 patient within 14 days of symptom onset. She also has not had a close contact with a suspected COVID-19 patient within 14 days of symptom onset.   Pertinent medical history  Milka has taken an antibiotic medication in the past month. Antibiotic details as reported by the patient (free text): Z-pack, emergency room visit   Milka does not get yeast infections when she takes antibiotics.   Milka does not need a return to work/school note.   Weight: 125 lbs   Milka does not smoke or use smokeless tobacco.   She denies pregnancy and denies breastfeeding. She is currently menstruating.   Additional information as reported by the patient (free text): My Covid-like symptoms began April 4 and have on and off, but constant, since then. Some days they'll go away, and then rear their heads again. Symptoms: headache/body ache/fever/chest pain and pressure, ear pain, exhaustion, yeast infection. I've spoken to doctors, had Covid test, came back negative, even got tested for pneumonia (i've had a few doctors say they suspected this to be covid). I'm hoping to speak with my doctor about this Dr. Lupillo Ellis but my clinic is closed. Thank you!   Weight: 125 lbs  A synchronous phone visit was initiated by the provider for the following reason: clarify reason for her oncare visit    MEDICATIONS: No current medications, ALLERGIES: NKDA  Clinician Response:  Dear Milka,    Dear Milka  Your symptoms show that you may have coronavirus (COVID-19). This illness can cause fever, cough and trouble breathing. Many people get a mild case and get better on their  own. Some people can get very sick.  Will I be tested for COVID-19?  Because we have limited testing supplies we are not testing everyone if they are low risk. We are testing if:   You are very ill. For example, you're on chemotherapy, dialysis or home hospice care. (Contact your specialty clinic or program.)   You live in a nursing home or other long-term care facility. (Talk to your nurse manager or medical director.)   You're a health care worker. ([facility] employees Contact our employee health office for testing.)   We are performing limited curbside testing for healthcare/first responders and people with medical problems that put them at increased risk. It does not appear by the [facility] information you submitted that you meet any of these criteria. If there are medical problems that we did not know about, please repeat an [facility] visit and let us know what medical conditions you have.   How can I protect others?  Without a test, we can't know for sure that you have COVID-19. For safety, it's very important to follow these rules.  First, stay home and away from others (self-isolate) until:   You've had no fever---and no medicine that reduces fever---for 3 full days (72 hours). And...    Your other symptoms have gotten better. For example, your cough or breathing has improved. And...   At least 10 days have passed since your symptoms started.   During this time:   Don't go to work, school or anywhere else.    Stay away from others in your home. No hugging, kissing or shaking hands.   Don't let anyone visit.   Cover your mouth and nose with a mask, tissue or wash cloth to avoid spreading germs.   Wash your hands and face often. Use soap and water.   How can I take care of myself?  1.Take Tylenol (acetaminophen) for fever or pain. If you have liver or kidney problems, ask your family doctor if it's okay to take Tylenol.   Adults can take either:    650 mg (two 325 mg pills) every 4 to 6 hours, or...   1,000  mg (two 500 mg pills) every 8 hours as needed.    Note: Don't take more than 3,000 mg in one day.  For children, check the Tylenol bottle for the right dose. The dose is based on the child's age or weight.   2.If you have other health problems (like cancer, heart failure, an organ transplant or severe kidney disease): Call your specialty clinic if you don't feel better in the next 2 days.  3.Know when to call 911: If your breathing is so bad that it keeps you from doing normal activities, call 911 or go to the emergency room. Tell them that you've been staying home and may have COVID-19.  4.Sign up for [employer]. We know it's scary to hear that you might have COVID-19. We want to track your symptoms to make sure you're okay over the next 2 weeks. Please look for an email from [employer]---this is a free, online program that we'll use to keep in touch. To sign up, follow the link in the email. Learn more at [email or web address].  Where can I get more information?  To learn more about COVID-19 and how to care for yourself at home, please visit the CDC website at https://www.cdc.gov/coronavirus/2019-ncov/about/steps-when-sick.html.  For more options for care at [facility], please visit our website at [email or web address]   If you are interested in becoming part of a [facility] clinic trial related to COVID19 please go to [email or web address] for information, if you qualify.       Diagnosis: Cough  Diagnosis ICD: R05  Triage Notes: I reviewed the patient's history, verified their identity, and explained the Visit process.    I reviewed the patient's history, verified their identity, and explained the Visit process.  Her main concerns stem around the duration of her illness and the chest pressure she has been having. She has been to the urgent care in [place] and to an urgency room in the last few weeks. She just finished a course of antibiotics  to rule out a bacterial source of her illness and still isn't feeling better. She has also had a negative COVID test this past monday. She states that her primary care office has an automated message referring patients to [facility].   We discussed her options and she wishes to try to get in touch with her PCP's office and schedule an in person visit with them or a provider they recommend. She is happy with the plan.  Synchronous Triage: phone, status: completed, duration: 632 seconds

## 2020-05-28 ENCOUNTER — COMMUNICATION - HEALTHEAST (OUTPATIENT)
Dept: INTERNAL MEDICINE | Facility: CLINIC | Age: 30
End: 2020-05-28

## 2020-05-28 DIAGNOSIS — F90.9 ATTENTION DEFICIT HYPERACTIVITY DISORDER (ADHD), UNSPECIFIED ADHD TYPE: ICD-10-CM

## 2020-06-03 ENCOUNTER — COMMUNICATION - HEALTHEAST (OUTPATIENT)
Dept: INTERNAL MEDICINE | Facility: CLINIC | Age: 30
End: 2020-06-03

## 2020-06-03 DIAGNOSIS — F90.9 ATTENTION DEFICIT HYPERACTIVITY DISORDER (ADHD), UNSPECIFIED ADHD TYPE: ICD-10-CM

## 2020-06-04 ENCOUNTER — AMBULATORY - HEALTHEAST (OUTPATIENT)
Dept: INTERNAL MEDICINE | Facility: CLINIC | Age: 30
End: 2020-06-04

## 2020-06-04 DIAGNOSIS — F90.9 ATTENTION DEFICIT HYPERACTIVITY DISORDER (ADHD), UNSPECIFIED ADHD TYPE: ICD-10-CM

## 2020-07-09 ENCOUNTER — COMMUNICATION - HEALTHEAST (OUTPATIENT)
Dept: INTERNAL MEDICINE | Facility: CLINIC | Age: 30
End: 2020-07-09

## 2020-07-09 DIAGNOSIS — F90.9 ATTENTION DEFICIT HYPERACTIVITY DISORDER (ADHD), UNSPECIFIED ADHD TYPE: ICD-10-CM

## 2020-08-06 ENCOUNTER — COMMUNICATION - HEALTHEAST (OUTPATIENT)
Dept: SCHEDULING | Facility: CLINIC | Age: 30
End: 2020-08-06

## 2020-08-06 DIAGNOSIS — F90.9 ATTENTION DEFICIT HYPERACTIVITY DISORDER (ADHD), UNSPECIFIED ADHD TYPE: ICD-10-CM

## 2020-09-30 ENCOUNTER — COMMUNICATION - HEALTHEAST (OUTPATIENT)
Dept: INTERNAL MEDICINE | Facility: CLINIC | Age: 30
End: 2020-09-30

## 2020-09-30 DIAGNOSIS — F90.9 ATTENTION DEFICIT HYPERACTIVITY DISORDER (ADHD), UNSPECIFIED ADHD TYPE: ICD-10-CM

## 2020-10-01 ENCOUNTER — AMBULATORY - HEALTHEAST (OUTPATIENT)
Dept: INTERNAL MEDICINE | Facility: CLINIC | Age: 30
End: 2020-10-01

## 2020-10-01 DIAGNOSIS — F90.9 ATTENTION DEFICIT HYPERACTIVITY DISORDER (ADHD), UNSPECIFIED ADHD TYPE: ICD-10-CM

## 2020-11-16 ENCOUNTER — COMMUNICATION - HEALTHEAST (OUTPATIENT)
Dept: INTERNAL MEDICINE | Facility: CLINIC | Age: 30
End: 2020-11-16

## 2020-11-16 DIAGNOSIS — F90.9 ATTENTION DEFICIT HYPERACTIVITY DISORDER (ADHD), UNSPECIFIED ADHD TYPE: ICD-10-CM

## 2021-01-04 ENCOUNTER — HEALTH MAINTENANCE LETTER (OUTPATIENT)
Age: 31
End: 2021-01-04

## 2021-01-07 ENCOUNTER — COMMUNICATION - HEALTHEAST (OUTPATIENT)
Dept: INTERNAL MEDICINE | Facility: CLINIC | Age: 31
End: 2021-01-07

## 2021-01-07 DIAGNOSIS — F90.9 ATTENTION DEFICIT HYPERACTIVITY DISORDER (ADHD), UNSPECIFIED ADHD TYPE: ICD-10-CM

## 2021-02-15 ENCOUNTER — COMMUNICATION - HEALTHEAST (OUTPATIENT)
Dept: INTERNAL MEDICINE | Facility: CLINIC | Age: 31
End: 2021-02-15

## 2021-03-05 ENCOUNTER — COMMUNICATION - HEALTHEAST (OUTPATIENT)
Dept: INTERNAL MEDICINE | Facility: CLINIC | Age: 31
End: 2021-03-05

## 2021-03-05 ENCOUNTER — COMMUNICATION - HEALTHEAST (OUTPATIENT)
Dept: ADMINISTRATIVE | Facility: CLINIC | Age: 31
End: 2021-03-05

## 2021-03-05 DIAGNOSIS — F90.9 ATTENTION DEFICIT HYPERACTIVITY DISORDER (ADHD), UNSPECIFIED ADHD TYPE: ICD-10-CM

## 2021-03-11 ENCOUNTER — AMBULATORY - HEALTHEAST (OUTPATIENT)
Dept: INTERNAL MEDICINE | Facility: CLINIC | Age: 31
End: 2021-03-11

## 2021-05-28 NOTE — TELEPHONE ENCOUNTER
Refill Request  Did you contact pharmacy: No  Medication name: dextroamphetamine-amphetamine (ADDERALL) 10 mg Tab tablet    Requested Prescriptions      No prescriptions requested or ordered in this encounter     Who prescribed the medication: Lupillo Ellis MD  Pharmacy Name and Location: [id number] - [place], [place] - [address] AT [facility]  Is patient out of medication: Yes  Patient notified refills processed in 72 hours:  yes  Okay to leave a detailed message: yes

## 2021-05-28 NOTE — TELEPHONE ENCOUNTER
Prescription Monitoring Program activity reviewed with no discrepancies noted.      Last fill per : 03/19/19  Quantity/days supply: #60 30 day supply    Controlled Substance Agreement on file: No  Date:     Last office visit with provider:  8/23/2018 Lupillo Ellis MD    Please advise.

## 2021-05-28 NOTE — TELEPHONE ENCOUNTER
Controlled Substance Refill Request  Medication:   Requested Prescriptions     Pending Prescriptions Disp Refills     dextroamphetamine-amphetamine (ADDERALL) 10 mg Tab tablet 60 tablet 0     Sig: Take 1 tablet by mouth 2 (two) times a day.     Date Last Fill: 3/19/19  Pharmacy: [id number]   Submit electronically to pharmacy  Controlled Substance Agreement on File:   Encounter-Level CSA Scan Date:    There are no encounter-level csa scan date.       Last office visit: Last office visit pertaining to requested medication was 11/19/18.

## 2021-05-29 NOTE — TELEPHONE ENCOUNTER
Controlled Substance Refill Request  Medication Name:   Requested Prescriptions     Pending Prescriptions Disp Refills     dextroamphetamine-amphetamine (ADDERALL) 10 mg Tab tablet 60 tablet 0     Sig: Take 1 tablet by mouth 2 (two) times a day.     Date Last Fill: 5/23/19  Pharmacy: [id number]      Submit electronically to pharmacy  Controlled Substance Agreement Date Scanned:   Encounter-Level CSA Scan Date:    There are no encounter-level csa scan date.       Last office visit with prescriber/PCP: 8/23/2018 Lupillo Ellis MD OR same dept: 6/6/2019 Octavio Bagley MD OR same specialty: 6/6/2019 Octavio Bagley MD  Last physical: 11/4/2016 Last MTM visit: Visit date not found

## 2021-05-29 NOTE — PROGRESS NOTES
Office Visit - Follow up    Milka Jules   28 y.o. female    Date of Visit: 6/6/2019    Chief Complaint   Patient presents with     Cough     productive cough X3-4 days     Fatigue     Shortness of Breath       Subjective: Very pleasant patient of Dr. Westfall with 3 to 5-day history of upper respiratory congestion followed by an intense cough producing thick green phlegm.  The phlegm is quite tenacious she does have difficulty with expectorating at times    Denies fever chills or dyspnea    ROS: A comprehensive review of systems was performed and was otherwise negative except as mentioned above.     Exam  Lungs rhonchi right base no signs of true consolidation no wheezes EENT otherwise negative   /70 (Patient Site: Left Arm, Patient Position: Sitting, Cuff Size: Adult Regular)   Pulse 90   Temp 98.5  F (36.9  C) (Oral)   Wt 132 lb (59.9 kg)   SpO2 98%   BMI 19.49 kg/m      Assessment and Plan  Bronchitis we will treat with Augmentin and symptomatic management with Mucinex and promethazine with codeine follow-up if not improved    Milka was seen today for cough, fatigue and shortness of breath.    Diagnoses and all orders for this visit:    Bronchitis  -     promethazine-codeine (PHENERGAN WITH CODEINE) 6.25-10 mg/5 mL syrup; Take 5 mL by mouth every 4 (four) hours as needed for cough.  -     amoxicillin-clavulanate (AUGMENTIN) 875-125 mg per tablet; Take 1 tablet by mouth 2 (two) times a day for 7 days.          Time: total time spent with the patient was 15 minutes of which >50% was spent in counseling and coordination of care        No Known Allergies    Medications :  Prior to Admission medications    Medication Sig Start Date End Date Taking? Authorizing Provider   dextroamphetamine-amphetamine (ADDERALL) 10 mg Tab tablet Take 1 tablet by mouth 2 (two) times a day. 5/23/19  Yes Lupillo Ellis MD   amoxicillin-clavulanate (AUGMENTIN) 875-125 mg per tablet Take 1 tablet by mouth 2 (two)  times a day for 7 days. 6/6/19 6/13/19  Octavio Bagley MD   promethazine-codeine (PHENERGAN WITH CODEINE) 6.25-10 mg/5 mL syrup Take 5 mL by mouth every 4 (four) hours as needed for cough. 6/6/19   Octavio Bagley MD   valACYclovir (VALTREX) 500 MG tablet TAKE 1 TABLET BY MOUTH TWICE DAILY 11/30/18   Lupillo Ellis MD   codeine-guaiFENesin (GUAIFENESIN AC)  mg/5 mL liquid Take 5 mL by mouth 3 (three) times a day as needed for cough. 11/19/18 6/6/19  Kasie Shah MD   meclizine (ANTIVERT) 25 mg tablet Take 1 tablet (25 mg total) by mouth 3 (three) times a day as needed for dizziness or nausea. 8/23/18 6/6/19  Lupillo Ellis MD   MICROGESTIN FE 1/20, 28, 1 mg-20 mcg (21)/75 mg (7) per tablet  7/22/15 6/6/19  PROVIDER, HISTORICAL        Past Medical History:   Past Medical History:   Diagnosis Date     ADHD (attention deficit hyperactivity disorder)      Benign paroxysmal positional vertigo, bilateral 8/23/2018     Enlarged lymph node in neck 11/4/2016       Past Surgical History:   Past Surgical History:   Procedure Laterality Date     WISDOM TOOTH EXTRACTION         Social History:   Social History     Socioeconomic History     Marital status:      Spouse name: Not on file     Number of children: Not on file     Years of education: Not on file     Highest education level: Not on file   Occupational History     Not on file   Social Needs     Financial resource strain: Not on file     Food insecurity:     Worry: Not on file     Inability: Not on file     Transportation needs:     Medical: Not on file     Non-medical: Not on file   Tobacco Use     Smoking status: Never Smoker     Smokeless tobacco: Never Used   Substance and Sexual Activity     Alcohol use: Yes     Comment: occasional     Drug use: Not on file     Sexual activity: Not on file   Lifestyle     Physical activity:     Days per week: Not on file     Minutes per session: Not on file     Stress: Not on file   Relationships      Social connections:     Talks on phone: Not on file     Gets together: Not on file     Attends Zoroastrian service: Not on file     Active member of club or organization: Not on file     Attends meetings of clubs or organizations: Not on file     Relationship status: Not on file     Intimate partner violence:     Fear of current or ex partner: Not on file     Emotionally abused: Not on file     Physically abused: Not on file     Forced sexual activity: Not on file   Other Topics Concern     Not on file   Social History Narrative     Not on file       Family History:   Family History   Problem Relation Age of Onset     No Medical Problems Mother         BRCA neg, family history Breast Ca         Octavio Bagley MD

## 2021-05-29 NOTE — TELEPHONE ENCOUNTER
Controlled Substance Refill Request  Medication Name:   Requested Prescriptions     Pending Prescriptions Disp Refills     dextroamphetamine-amphetamine (ADDERALL) 10 mg Tab tablet 60 tablet 0     Sig: Take 1 tablet by mouth 2 (two) times a day.     Date Last Fill: 4/24/19  Pharmacy: [facility]       Submit electronically to pharmacy  Controlled Substance Agreement Date Scanned:   Encounter-Level CSA Scan Date:    There are no encounter-level csa scan date.       Last office visit with prescriber/PCP: 8/23/2018 Lupillo Ellis MD OR same dept: 11/19/2018 Kasie Shah MD OR same specialty: 11/19/2018 Kasie Shah MD  Last physical: 11/4/2016 Last MTM visit: Visit date not found

## 2021-05-30 NOTE — TELEPHONE ENCOUNTER
Prescription Monitoring Program activity reviewed with no discrepancies noted.      Last fill per : 6/18/19  Quantity/days supply: 60 tablets for 30 days    Controlled Substance Agreement on file: No  Date: NA    Last office visit with provider:  8/23/2018 Lupillo Ellis MD    Please advise.

## 2021-05-30 NOTE — TELEPHONE ENCOUNTER
Controlled Substance Refill Request  Medication Name:   Requested Prescriptions     Pending Prescriptions Disp Refills     dextroamphetamine-amphetamine (ADDERALL) 10 mg Tab tablet 60 tablet 0     Sig: Take 1 tablet by mouth 2 (two) times a day.     Date Last Fill: 6/18/19  Pharmacy: [id number]      Submit electronically to pharmacy  Controlled Substance Agreement Date Scanned:   Encounter-Level CSA Scan Date:    There are no encounter-level csa scan date.       Last office visit with prescriber/PCP: 8/23/2018 Lupillo Ellis MD OR same dept: 7/1/2019 Octavio Bagley MD OR same specialty: 7/1/2019 Octavio Bagley MD  Last physical: 11/4/2016 Last MTM visit: Visit date not found

## 2021-05-30 NOTE — PROGRESS NOTES
Office Visit - Follow up    Milka Jules   28 y.o. female    Date of Visit: 7/1/2019    Chief Complaint   Patient presents with     Sore Throat     right side of throat. x6 days     Ear Pain     right ear pain.        Subjective: Chuyita is here with right-sided throat pain in addition to right ear fullness.  Notes from previous visit are reviewed symptoms at that time did resolve completely.  Denies fever or chills.    ROS: A comprehensive review of systems was performed and was otherwise negative except as mentioned above.     Exam  Some erythema on the right side of throat no evidence of exudate.  Bulging right TM with minimal erythema.  Otherwise negative.  Beta strep screen negative.   /76 (Patient Site: Right Arm, Patient Position: Sitting, Cuff Size: Adult Regular)   Pulse 76   Temp 98.1  F (36.7  C) (Oral)   Resp 16   Wt 134 lb (60.8 kg)   SpO2 99%   BMI 19.79 kg/m      Assessment and Plan  Serous otitis and mild pharyngitis.  Treat symptomatically with antihistamine/decongestant combination.  View of upcoming holiday I have given her a printed prescription for a azithromycin she will wait at least 3 days and if she notes no improvement in her sore throat cough or ear pain will begin a dose of a azithromycin as noted    Milka was seen today for sore throat and ear pain.    Diagnoses and all orders for this visit:    Throat pain  -     Rapid Strep A Screen-Throat  -     azithromycin (ZITHROMAX) 250 MG tablet; Take 500mg (2 tablets) day one, and then 250mg (1 tablet) days 2-5  -     Group A Strep, RNA Direct Detection, Throat    Bilateral acute serous otitis media, recurrence not specified  -     azithromycin (ZITHROMAX) 250 MG tablet; Take 500mg (2 tablets) day one, and then 250mg (1 tablet) days 2-5          Time: total time spent with the patient was 15 minutes of which >50% was spent in counseling and coordination of care        No Known Allergies    Medications :  Prior to Admission  medications    Medication Sig Start Date End Date Taking? Authorizing Provider   dextroamphetamine-amphetamine (ADDERALL) 10 mg Tab tablet Take 1 tablet by mouth 2 (two) times a day.  Patient taking differently: Take 10 mg by mouth as needed.        6/18/19  Yes Lupillo Ellis MD   valACYclovir (VALTREX) 500 MG tablet TAKE 1 TABLET BY MOUTH TWICE DAILY 11/30/18  Yes Lupillo Ellis MD   azithromycin (ZITHROMAX) 250 MG tablet Take 500mg (2 tablets) day one, and then 250mg (1 tablet) days 2-5 7/1/19   Octavio Bagley MD   promethazine-codeine (PHENERGAN WITH CODEINE) 6.25-10 mg/5 mL syrup Take 5 mL by mouth every 4 (four) hours as needed for cough. 6/6/19   Octavio Bagley MD        Past Medical History:   Past Medical History:   Diagnosis Date     ADHD (attention deficit hyperactivity disorder)      Benign paroxysmal positional vertigo, bilateral 8/23/2018     Enlarged lymph node in neck 11/4/2016       Past Surgical History:   Past Surgical History:   Procedure Laterality Date     WISDOM TOOTH EXTRACTION         Social History:   Social History     Socioeconomic History     Marital status:      Spouse name: Not on file     Number of children: Not on file     Years of education: Not on file     Highest education level: Not on file   Occupational History     Not on file   Social Needs     Financial resource strain: Not on file     Food insecurity:     Worry: Not on file     Inability: Not on file     Transportation needs:     Medical: Not on file     Non-medical: Not on file   Tobacco Use     Smoking status: Never Smoker     Smokeless tobacco: Never Used   Substance and Sexual Activity     Alcohol use: Yes     Comment: occasional     Drug use: Not on file     Sexual activity: Not on file   Lifestyle     Physical activity:     Days per week: Not on file     Minutes per session: Not on file     Stress: Not on file   Relationships     Social connections:     Talks on phone: Not on file     Gets  together: Not on file     Attends Alevism service: Not on file     Active member of club or organization: Not on file     Attends meetings of clubs or organizations: Not on file     Relationship status: Not on file     Intimate partner violence:     Fear of current or ex partner: Not on file     Emotionally abused: Not on file     Physically abused: Not on file     Forced sexual activity: Not on file   Other Topics Concern     Not on file   Social History Narrative     Not on file       Family History:   Family History   Problem Relation Age of Onset     No Medical Problems Mother         BRCA neg, family history Breast Ca         Octavio Bagley MD

## 2021-05-31 VITALS — WEIGHT: 130 LBS

## 2021-05-31 VITALS — WEIGHT: 125 LBS

## 2021-05-31 NOTE — TELEPHONE ENCOUNTER
Controlled Substance Refill Request  Medication Name:   Requested Prescriptions     Pending Prescriptions Disp Refills     dextroamphetamine-amphetamine (ADDERALL) 10 mg Tab tablet 60 tablet 0     Sig: Take 1 tablet by mouth 2 (two) times a day.     Date Last Fill: 7/15/19  Pharmacy: [facility]      Submit electronically to pharmacy  Controlled Substance Agreement Date Scanned:   Encounter-Level CSA Scan Date:    There are no encounter-level csa scan date.       Last office visit with prescriber/PCP: 8/23/2018 Lupillo Ellis MD OR same dept: 7/1/2019 Octavio Bagley MD OR same specialty: 7/1/2019 Octavio Bagley MD  Last physical: 11/4/2016 Last MTM visit: Visit date not found

## 2021-05-31 NOTE — TELEPHONE ENCOUNTER
Left detailed message for the patient relaying message below from Dr. Ellis.  Asked the patient to call to schedule an appointment as requested by Dr. Ellis.  Iman NUR CMA/RICKEY....................9:59 AM

## 2021-05-31 NOTE — TELEPHONE ENCOUNTER
Please call and help her to schedule a follow-up appointment in the next month as this will be needed for any further refills of her Adderall.

## 2021-06-01 VITALS — HEIGHT: 69 IN | BODY MASS INDEX: 18.96 KG/M2 | WEIGHT: 128 LBS

## 2021-06-01 VITALS — BODY MASS INDEX: 19.99 KG/M2 | WEIGHT: 135 LBS | HEIGHT: 69 IN

## 2021-06-01 NOTE — TELEPHONE ENCOUNTER
She needs to schedule follow-up appointment for further refills.  This message was included with her last refill.

## 2021-06-01 NOTE — TELEPHONE ENCOUNTER
Controlled Substance Refill Request  Medication Name:   Requested Prescriptions     Pending Prescriptions Disp Refills     dextroamphetamine-amphetamine (ADDERALL) 10 mg Tab tablet 60 tablet 0     Sig: Take 1 tablet by mouth 2 (two) times a day. Appointment needed for further refills     Date Last Fill: 8/12/19  [facility]      Submit electronically to pharmacy  Controlled Substance Agreement Date Scanned:   Encounter-Level CSA Scan Date:    There are no encounter-level csa scan date.       Last office visit with prescriber/PCP: 8/23/2018 Lupillo Ellis MD OR same dept: 7/1/2019 Octavio Bagley MD OR same specialty: 7/1/2019 Octavio Bagley MD  Last physical: 11/4/2016 Last MTM visit: Visit date not found

## 2021-06-01 NOTE — PROGRESS NOTES
Office Visit - Follow Up   Milka Jules   29 y.o. female    Date of Visit: 9/19/2019    Chief Complaint   Patient presents with     medication refill     Adderal        Assessment and Plan   1. Attention deficit hyperactivity disorder (ADHD), unspecified ADHD type  Will refill Adderall IR 10 mg twice daily.  Using medication appropriately and effective controlling her ADHD symptoms.  Tolerating without side effects.  - dextroamphetamine-amphetamine (ADDERALL) 10 mg Tab tablet; Take 1 tablet by mouth 2 (two) times a day.  Dispense: 60 tablet; Refill: 0    2. Recurrent herpes simplex  Will refill Valtrex to use as needed  - valACYclovir (VALTREX) 500 MG tablet; Take 1 tablet (500 mg total) by mouth 2 (two) times a day.  Dispense: 10 tablet; Refill: 11    3.  She follows up with her OB/GYN for routine preventive care.  Declines having flu shot today.    Return in about 1 year (around 9/19/2020) for Recheck.     History of Present Illness   This 29 y.o. old woman with well-established diagnosis of ADHD on treatment since adolescence.  Previously on Adderall XR but experiencing side effects including insomnia and suppressed appetite.  Has been using Adderall IR 10 mg twice daily for the past 18 months with good results.  She takes the morning dose almost every day good but needs the evening dose only on occasion depending on daily situation.  The medication is allowing her to stay focused and providing ability to concentrate without becoming distracted.  She has felt less side effects with IR version.  Less problems with insomnia.  Weight is stable.  Appetite is normal.  Denies any anxiety, restlessness or agitation.    Otherwise doing well.  She does need a refill of her Valtrex which she will take when she has an outbreak of HSV.    Review of Systems:  Otherwise, a comprehensive review of systems was negative except as noted.     Medications, Allergies and Problem List   Patient Active Problem List   Diagnosis  "    ADHD (attention deficit hyperactivity disorder)     Benign paroxysmal positional vertigo, bilateral       She has a past surgical history that includes [place] tooth extraction.    No Known Allergies    Current Outpatient Medications   Medication Sig Dispense Refill     dextroamphetamine-amphetamine (ADDERALL) 10 mg Tab tablet Take 1 tablet by mouth 2 (two) times a day. 60 tablet 0     valACYclovir (VALTREX) 500 MG tablet Take 1 tablet (500 mg total) by mouth 2 (two) times a day. 10 tablet 11     No current facility-administered medications for this visit.         Physical Exam   General Appearance:   Well-appearing young woman    /70 (Patient Site: Left Arm, Patient Position: Sitting, Cuff Size: Adult Large)   Pulse 70   Ht 5' 9\" (1.753 m)   Wt 133 lb (60.3 kg)   SpO2 98%   BMI 19.64 kg/m               Additional Information   Social History     Tobacco Use     Smoking status: Never Smoker     Smokeless tobacco: Never Used   Substance Use Topics     Alcohol use: Yes     Comment: occasional     Drug use: Not on file          Lupillo Ellis MD  "

## 2021-06-02 VITALS — WEIGHT: 131 LBS | HEIGHT: 69 IN | BODY MASS INDEX: 19.4 KG/M2

## 2021-06-02 VITALS — WEIGHT: 132 LBS | BODY MASS INDEX: 19.49 KG/M2

## 2021-06-02 NOTE — TELEPHONE ENCOUNTER
Patient is asking for this request to be expedited if possible.   Patient stated she is out of her medication.    Controlled Substance Refill Request  Medication Name:   Requested Prescriptions     Pending Prescriptions Disp Refills     dextroamphetamine-amphetamine (ADDERALL) 10 mg Tab tablet 60 tablet 0     Sig: Take 1 tablet by mouth 2 (two) times a day.     Date Last Fill: 9/19/19  Pharmacy: [facility] ([facility])      Submit electronically to pharmacy  Controlled Substance Agreement Date Scanned:   Encounter-Level CSA Scan Date:    There are no encounter-level csa scan date.       Last office visit with prescriber/PCP: 9/19/2019 Lupillo Ellis MD OR same dept: 9/19/2019 Lupillo Ellis MD OR same specialty: 9/19/2019 Lupillo Ellis MD  Last physical: 11/4/2016 Last MTM visit: Visit date not found

## 2021-06-02 NOTE — TELEPHONE ENCOUNTER
Prescription Monitoring Program activity reviewed with no discrepancies noted.      Last fill per : 9/19/19   Quantity/days supply: 60 tablets for 30 days    Controlled Substance Agreement on file: No  Date: NA    Last office visit with provider:  9/19/2019 Lupillo Ellis MD    Please advise.

## 2021-06-03 VITALS
WEIGHT: 133 LBS | SYSTOLIC BLOOD PRESSURE: 110 MMHG | HEIGHT: 69 IN | DIASTOLIC BLOOD PRESSURE: 70 MMHG | OXYGEN SATURATION: 98 % | BODY MASS INDEX: 19.7 KG/M2 | HEART RATE: 70 BPM

## 2021-06-03 VITALS — WEIGHT: 134 LBS | BODY MASS INDEX: 19.79 KG/M2

## 2021-06-03 NOTE — TELEPHONE ENCOUNTER
Controlled Substance Refill Request  Medication Name:   Requested Prescriptions     Pending Prescriptions Disp Refills     dextroamphetamine-amphetamine (ADDERALL) 10 mg Tab tablet 60 tablet 0     Sig: Take 1 tablet by mouth 2 (two) times a day.     Date Last Fill: 10/21/19  Pharmacy: [facility] #[id number] - [place], [place] - [address] [facility] [address]     Submit electronically to pharmacy  Controlled Substance Agreement Date Scanned:   Encounter-Level CSA Scan Date:    There are no encounter-level csa scan date.       Last office visit with prescriber/PCP: 9/19/2019 Lupillo Ellis MD OR same dept: 9/19/2019 Lupillo Ellis MD OR same specialty: 9/19/2019 Lupillo Ellis MD  Last physical: 11/4/2016 Last MTM visit: Visit date not found    Patient states she is out of medication, She would like this to be Expedited as a 1 time curtsey   Please advise

## 2021-06-04 VITALS
WEIGHT: 133 LBS | DIASTOLIC BLOOD PRESSURE: 80 MMHG | OXYGEN SATURATION: 100 % | HEART RATE: 83 BPM | SYSTOLIC BLOOD PRESSURE: 120 MMHG | BODY MASS INDEX: 19.7 KG/M2 | HEIGHT: 69 IN

## 2021-06-04 NOTE — TELEPHONE ENCOUNTER
Who is calling:  Patient calling back  Reason for Call:  Call was dropped. Asking to  the medication today, leaving town tomorrow.  Date of last appointment with primary care: 09/19/19  Okay to leave a detailed message: Yes

## 2021-06-04 NOTE — TELEPHONE ENCOUNTER
Patient is out of medication.        Controlled Substance Refill Request  Medication Name:   Requested Prescriptions     Pending Prescriptions Disp Refills     dextroamphetamine-amphetamine (ADDERALL) 10 mg Tab tablet 60 tablet 0     Sig: Take 1 tablet by mouth 2 (two) times a day.     Date Last Fill: 11/21/19  Pharmacy: [facility] #[id number]       Submit electronically to pharmacy  Controlled Substance Agreement Date Scanned:   Encounter-Level CSA Scan Date:    There are no encounter-level csa scan date.       Last office visit with prescriber/PCP: 9/19/2019 Lupillo Ellis MD OR same dept: 9/19/2019 Lupillo Ellis MD OR same specialty: 9/19/2019 Lupillo Ellis MD  Last physical: 11/4/2016 Last MTM visit: Visit date not found

## 2021-06-05 NOTE — TELEPHONE ENCOUNTER
Prescription Monitoring Program activity reviewed with no discrepancies noted.      Last fill per : 12/23/19   Quantity/days supply: 60 tablets for 30 days    Controlled Substance Agreement on file: No  Date: NA    Last office visit with provider:  9/19/2019 Lupillo Ellis MD    Please advise.

## 2021-06-05 NOTE — TELEPHONE ENCOUNTER
Controlled Substance Refill Request  Medication Name:   Requested Prescriptions     Pending Prescriptions Disp Refills     dextroamphetamine-amphetamine (ADDERALL) 10 mg Tab tablet 60 tablet 0     Sig: Take 1 tablet by mouth 2 (two) times a day.     Date Last Fill: 12/23/19  Is patient out of medication?: No, one days left  Patient notified refills processed within 3 business days:  Yes  Requested Pharmacy: [facility]  Submit electronically to pharmacy  Controlled Substance Agreement on file:   Encounter-Level CSA Scan Date:    There are no encounter-level csa scan date.        Last office visit:  9/19/19

## 2021-06-06 NOTE — TELEPHONE ENCOUNTER
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      Controlled Substance Refill Request  Medication Name:   Requested Prescriptions     Pending Prescriptions Disp Refills     dextroamphetamine-amphetamine (ADDERALL) 10 mg Tab tablet 60 tablet 0     Sig: Take 1 tablet by mouth 2 (two) times a day.     Date Last Fill: 2/18/20  Is patient out of medication?: No, 2 days left  Patient notified refills processed within 3 business days:  Yes  Requested Pharmacy: [facility]  Submit electronically to pharmacy  Controlled Substance Agreement on file:   Encounter-Level CSA Scan Date:    There are no encounter-level csa scan date.        Last office visit:  2/28/20

## 2021-06-06 NOTE — PROGRESS NOTES
Office Visit - Follow Up   Milka Jules   29 y.o. female    Date of Visit: 2/28/2020    Chief Complaint   Patient presents with     Urinary Frequency     cramping, pain, foul smell        Assessment and Plan   1. Acute diarrhea  Viral gastroenteritis causing mild abdominal discomfort and acute diarrheal illness.  Recommending good hydration, acetaminophen, Imodium, avoiding caffeine and dairy and eating bland diet.      2. UTI symptoms   Urinalysis is completely normal.  Antibiotics are not indicated  - Urinalysis-UC if Indicated    Return in about 6 months (around 8/28/2020) for Recheck.     History of Present Illness   This 29 y.o. old woman who has not felt well the last couple days.  Developed diarrhea last night with some vague abdominal discomfort and noticed some dysuria and foul-smelling urine this morning.  Feeling somewhat chilled.  Mild headache.  No recent travel.  No recent antibiotic use.  No nausea or vomiting.  No change in diet.    Review of Systems:  Otherwise, a comprehensive review of systems was negative except as noted.     Medications, Allergies and Problem List   Patient Active Problem List   Diagnosis     ADHD (attention deficit hyperactivity disorder)     Benign paroxysmal positional vertigo, bilateral     Acute diarrhea       She has a past surgical history that includes [place] tooth extraction.    No Known Allergies    Current Outpatient Medications   Medication Sig Dispense Refill     dextroamphetamine-amphetamine (ADDERALL) 10 mg Tab tablet Take 1 tablet by mouth 2 (two) times a day. 60 tablet 0     valACYclovir (VALTREX) 500 MG tablet Take 1 tablet (500 mg total) by mouth 2 (two) times a day. 10 tablet 11     ciprofloxacin HCl (CIPRO) 500 MG tablet Take 1 tablet (500 mg total) by mouth 2 (two) times a day for 3 days. 6 tablet 0     No current facility-administered medications for this visit.         Physical Exam   General Appearance:   Well-appearing young woman    /80  "(Patient Site: Left Arm, Patient Position: Sitting, Cuff Size: Adult Large)   Pulse 83   Ht 5' 9\" (1.753 m)   Wt 133 lb (60.3 kg)   SpO2 100%   BMI 19.64 kg/m        Abdomen with some mild tenderness  left midabdomen without rebound.  No masses or hepatosplenomegaly    Urinalysis normal     Additional Information   Social History     Tobacco Use     Smoking status: Never Smoker     Smokeless tobacco: Never Used   Substance Use Topics     Alcohol use: Yes     Comment: occasional     Drug use: Not on file              Lupillo Ellis MD  "

## 2021-06-06 NOTE — TELEPHONE ENCOUNTER
Controlled Substance Refill Request  Medication Name:   Requested Prescriptions     Pending Prescriptions Disp Refills     dextroamphetamine-amphetamine (ADDERALL) 10 mg Tab tablet 60 tablet 0     Sig: Take 1 tablet by mouth 2 (two) times a day.     Date Last Fill: 1/20/2020  Requested Pharmacy: [facility] #[id number]  Submit electronically to pharmacy  Controlled Substance Agreement on file:   Encounter-Level CSA Scan Date:    There are no encounter-level csa scan date.        Last office visit:  9/19/19

## 2021-06-06 NOTE — TELEPHONE ENCOUNTER
If she is having classic UTI symptoms of burning or increased frequency, I can send a prescription for antibiotics to her pharmacy.  Please notify

## 2021-06-06 NOTE — TELEPHONE ENCOUNTER
Pt called in ask antibiotic Rx for UTI.  Inform the Pt she to be seen at the clinic or [place] in order to get test and to get appropriate medication. Pt verbalized understand, no other concern at this time.        Richi Peralta RN, [facility] Triage/Med Refill 2/28/2020 1:30 PM

## 2021-06-07 NOTE — TELEPHONE ENCOUNTER
Controlled Substance Refill Request  Medication Name:   Requested Prescriptions     Pending Prescriptions Disp Refills     dextroamphetamine-amphetamine (ADDERALL) 10 mg Tab tablet 60 tablet 0     Sig: Take 1 tablet by mouth 2 (two) times a day.     Date Last Fill: 03/16/202  Requested Pharmacy: [facility] #[id number] - [place], [place] - [address]   Submit electronically to pharmacy  Controlled Substance Agreement on file:   Encounter-Level CSA Scan Date:    There are no encounter-level csa scan date.        Last office visit:  02/28/2020

## 2021-06-08 NOTE — TELEPHONE ENCOUNTER
Tarun Cifuentes for refill requested below?  Refill has been set up for you to review.    Prescription Monitoring Program activity reviewed with no discrepancies noted.      Last fill per : 4/23/2020  Quantity/days supply: #60 for a 30 day supply    Controlled Substance Agreement on file: No  Date:     Last office visit with provider:  2/28/2020 Lupillo Ellis MD    Please advise.  Iman NUR CMA/RICKEY....................11:51 AM

## 2021-06-08 NOTE — TELEPHONE ENCOUNTER
I do not see that this medication was authorized yesterday which is unfortunate under the circumstances especially as she had run out.  I sent refill this morning.  Please notify

## 2021-06-08 NOTE — TELEPHONE ENCOUNTER
Medication Request  Medication name:   dextroamphetamine-amphetamine (ADDERALL) 10 mg Tab tablet  60 tablet  0  5/28/2020      Sig - Route: Take 1 tablet by mouth 2 (two) times a day. - Oral     Sent to pharmacy as: dextroamphetamine-amphetamine 10 mg tablet (AdderalL)     Earliest Fill Date: 5/28/2020     E-Prescribing Status: Receipt confirmed by pharmacy (5/28/2020  4:42 PM CDT)         Requested Pharmacy: [facility] #[id number]  Reason for request:   Patient's pharmacy is closed.  Please send to different Pharmacy.  When did you use medication last?:    Currently taking.  Patient offered appointment:  No  Okay to leave a detailed message: yes      Patient is out of above medication.

## 2021-06-08 NOTE — TELEPHONE ENCOUNTER
Controlled Substance Refill Request  Medication Name:   Requested Prescriptions     Pending Prescriptions Disp Refills     dextroamphetamine-amphetamine (ADDERALL) 10 mg Tab tablet 60 tablet 0     Sig: Take 1 tablet by mouth 2 (two) times a day.     Date Last Fill: 4/23/2020  Is patient out of medication?: No, 1 days left  Patient notified refills processed within 3 business days:  Yes  Requested Pharmacy: [facility]  Submit electronically to pharmacy  Controlled Substance Agreement on file:   Encounter-Level CSA Scan Date:    There are no encounter-level csa scan date.        Last office visit:  9/19/19

## 2021-06-08 NOTE — TELEPHONE ENCOUNTER
Spoke with the patient and relayed message below from Dr. Ellis.  She verbalized understanding and had no further questions at this time.  Iman NUR, YANIQUE/CMT....................8:14 AM

## 2021-06-09 NOTE — TELEPHONE ENCOUNTER
Controlled Substance Refill Request  Medication Name:   Requested Prescriptions     Pending Prescriptions Disp Refills     dextroamphetamine-amphetamine (ADDERALL) 10 mg Tab tablet 60 tablet 0     Sig: Take 1 tablet by mouth 2 (two) times a day.     Date Last Fill: 06/04/20  Requested Pharmacy: [facility]  Submit electronically to pharmacy  Controlled Substance Agreement on file:   Encounter-Level CSA Scan Date:    There are no encounter-level csa scan date.        Last office visit:  09/19/19

## 2021-06-10 NOTE — TELEPHONE ENCOUNTER
Controlled Substance Refill Request  Medication Name:   Requested Prescriptions     Pending Prescriptions Disp Refills     dextroamphetamine-amphetamine (ADDERALL) 10 mg Tab tablet 60 tablet 0     Sig: Take 1 tablet by mouth 2 (two) times a day.     Date Last Fill: 7/9/20  Requested Pharmacy: [facility] # [id number]  Submit electronically to pharmacy  Controlled Substance Agreement on file:   Encounter-Level CSA Scan Date:    There are no encounter-level csa scan date.        Last office visit:  2/28/20

## 2021-06-11 NOTE — TELEPHONE ENCOUNTER
Prescription Monitoring Program activity reviewed with no discrepancies noted.      Last fill per : 8/7/20  Quantity/days supply: 60 tablets     Controlled Substance Agreement on file: No  Date: NA    Last office visit with provider:  2/28/2020 Lupillo Ellis MD    Please advise.

## 2021-06-11 NOTE — TELEPHONE ENCOUNTER
Controlled Substance Refill Request  Medication Name:   Requested Prescriptions     Pending Prescriptions Disp Refills     dextroamphetamine-amphetamine (ADDERALL) 10 mg Tab tablet 60 tablet 0     Sig: Take 1 tablet by mouth 2 (two) times a day.     Date Last Fill: 8/7/2020  Is patient out of medication?: No, 2 days left  Patient notified refills processed within 3 business days:  Yes  Requested Pharmacy: [facility]  Submit electronically to pharmacy  Controlled Substance Agreement on file:   Encounter-Level CSA Scan Date:    There are no encounter-level csa scan date.        Last office visit:  9/19/2019

## 2021-06-12 NOTE — PROGRESS NOTES
Office Visit - Follow Up   Milka Blanca   27 y.o. female    Date of Visit: 7/25/2017    Chief Complaint   Patient presents with     Dizziness     onset x several days  - feels drunk, ear pains; no fever        Assessment and Plan   1. Vertigo  Labs today for evaluation.  MRI given her parotid cancer history.  She is mildly orthostatic today.  Push fluids and's higher salt foods today.  Call if anything new.  Be seen next week for follow-up.  - MR Brain Without Contrast; Future  - Comprehensive Metabolic Panel  - HM2(CBC w/o Differential)  - Urinalysis-UC if Indicated  - Pregnancy, Urine    2. Lightheaded  As above, push food and fluids and try to get some salty foods in today.  Labs as below.  - MR Brain Without Contrast; Future  - Comprehensive Metabolic Panel  - HM2(CBC w/o Differential)  - Urinalysis-UC if Indicated  - Pregnancy, Urine    3. History of parotid cancer  She needs follow-up with Dr. eWstfall soon to go over her most recent very significant history and follow-up on how she is doing        Return in about 1 week (around 8/1/2017) for Recheck.     History of Present Illness   This 27 y.o. old very nice patient of Dr. Westfall's comes in today as an urgent add-on for evaluation of lightheadedness and dizziness.  She is accompanied by her mother.  She just had a wedding a few weeks ago and was in [place].  That was 2 weeks ago.  She came back she has been feeling fine until this last couple days when she is felt very lightheaded and dizzy.  The room spins especially when she is upright.  Standing up makes things worse.  She had a normal period recently.  She is on the pill.  Does not think she is pregnant.  Her ears feel somewhat uncomfortable.  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  It is healed up they told her that she did not need any further evaluation or management.  She feels good otherwise.  No bleeding.  She did have  a what sounds like life-threatening nosebleed after 6 nasal septal surgery earlier this year 2.  We do not have any of those records.    Review of Systems: A comprehensive review of systems was negative except as noted.     Medications, Allergies and Problem List   Reviewed and updated     Physical Exam   General Appearance:   Delightful woman.  Thin.  Ears appear completely normal.  No focal neurologic deficits.    /90 (Patient Site: Right Arm, Patient Position: Sitting, Cuff Size: Adult Regular)  Pulse 80  Temp 98.2  F (36.8  C)  Wt 125 lb (56.7 kg)  SpO2 100%         Additional Information   Current Outpatient Prescriptions   Medication Sig Dispense Refill     dextroamphetamine-amphetamine (ADDERALL XR) 20 MG 24 hr capsule Take 1 capsule (20 mg total) by mouth every morning. 30 capsule 0     MICROGESTIN FE 1/20, 28, 1 mg-20 mcg (21)/75 mg (7) per tablet        valACYclovir (VALTREX) 500 MG tablet TAKE 1 TABLET BY MOUTH TWICE DAILY 10 tablet 2     No current facility-administered medications for this visit.      No Known Allergies  Social History   Substance Use Topics     Smoking status: Never Smoker     Smokeless tobacco: None     Alcohol use None       Review and/or order of clinical lab tests:  Review and/or order of radiology tests:  Review and/or order of medicine tests:  Discussion of test results with performing physician:  Decision to obtain old records and/or obtain history from someone other than the patient:  Review and summarization of old records and/or obtaining history from someone other than the patient and.or discussion of case with another health care provider:  Independent visualization of image, tracing or specimen itself:    Time: total time spent with the patient was 25 minutes of which >50% was spent in counseling and coordination of care     Antonio Gusman MD

## 2021-06-13 NOTE — PROGRESS NOTES
[facility] Follow Up Note    Milka Blanca   27 y.o. female    Date of Visit: 10/16/2017    Chief Complaint   Patient presents with     Sore Throat     prone to strep, has neck and head pain     Subjective  This is a very pleasant 27-year-old patient of Dr. Lupillo Westfall.  She comes in because of a respiratory infection.  She and her  were returning from [place] yesterday when she began to develop problems with a very sore throat, chills and some sinus congestion.  No cough and no fever.  She has had a history in the past of strep infections and so was concerned about this.  There have been no improvements in this her symptoms since yesterday.  Otherwise she had been feeling in her usual state of health.    ROS A comprehensive review of systems was performed and was otherwise negative    Medications, allergies, and problem list were reviewed and updated    Exam  General Appearance:   On examination her blood pressure is 120/60.  Weight is 130 pounds.    Heart is in sinus rhythm with a rate of 86 and no ectopy.    No enlarged cervical lymph nodes.    No facial tenderness.    Throat is slightly erythematous but there is no swelling and no exudate.    The patient is alert and oriented ×3.      Assessment/Plan  1. URI (upper respiratory infection)       Sore throat and respiratory symptoms and a lady was had a history of strep.  Although it does not look like a strep at this point given her history I think it would be best to just put her on some Zithromax.  She is agreeable to this and will follow-up if she is not improving.  Body Mass Index was not assessed due to Patient was in with an acute medical issue..    Bebo Alonso MD      Current Outpatient Prescriptions on File Prior to Visit   Medication Sig     dextroamphetamine-amphetamine (ADDERALL XR) 20 MG 24 hr capsule Take 1 capsule (20 mg total) by mouth every morning.     meclizine (ANTIVERT) 25 mg tablet Take 0.5-1  tablets (12.5-25 mg total) by mouth 3 (three) times a day as needed for dizziness or nausea.     MICROGESTIN FE 1/20, 28, 1 mg-20 mcg (21)/75 mg (7) per tablet      valACYclovir (VALTREX) 500 MG tablet TAKE 1 TABLET BY MOUTH TWICE DAILY     No current facility-administered medications on file prior to visit.      No Known Allergies  Social History   Substance Use Topics     Smoking status: Never Smoker     Smokeless tobacco: None     Alcohol use None

## 2021-06-13 NOTE — TELEPHONE ENCOUNTER
Controlled Substance Refill Request  Medication Name:   Requested Prescriptions     Pending Prescriptions Disp Refills     dextroamphetamine-amphetamine (ADDERALL) 10 mg Tab tablet 60 tablet 0     Sig: Take 1 tablet by mouth 2 (two) times a day.     Date Last Fill: 10/01/20  Requested Pharmacy: [facility]  Submit electronically to pharmacy  Controlled Substance Agreement on file:   Encounter-Level CSA Scan Date:    There are no encounter-level csa scan date.        Last office visit:

## 2021-06-14 NOTE — TELEPHONE ENCOUNTER
Reason for Call:  Medication or medication refill:    Do you use a [facility]?  Name of the pharmacy and phone number for the current request: [facility]  [phone]    Name of the medication requested:  dextroamphetamine-amphetamine (ADDERALL) 10 mg Tab tablet    Can we leave a detailed message on this number? Yes    Phone number patient can be reached at: Home number on file [phone] (home)    Best Time: any    Call taken on 1/7/2021 at 1:45 PM by Laura L Goldberg

## 2021-06-15 NOTE — TELEPHONE ENCOUNTER
Called and spoke with patient briefly.  She will be calling back shortly.    Please assist patient in scheduling F/U with Dr. Ellis in the next 4 weeks.    Thanks,  Isaura Gonzalez, CMA

## 2021-06-15 NOTE — TELEPHONE ENCOUNTER
Reason for Call:  Medication or medication refill:    Do you use a [facility]?  Name of the pharmacy and phone number for the current request: [facility] [phone]      forest of the medication requested: dextroamphetamine-amphetamine (ADDERALL) 10 mg Tab tablet    Other request: Patient has med check appt 3/11 with Dr Ellis    Can we leave a detailed message on this number? Yes    Phone number patient can be reached at: Home number on file [phone] (home)    Best Time: any     Call taken on 3/5/2021 at 1:35 PM by Laura L Goldberg

## 2021-06-16 NOTE — PROGRESS NOTES
Office Visit - Follow Up   Milka Blanca   27 y.o. female    Date of Visit: 3/19/2018    Chief Complaint   Patient presents with     Medication Refill     Adderall        Assessment and Plan   1. ADHD (attention deficit hyperactivity disorder)  We will try switching to Adderall IR 10 mg twice daily.  She will see if she can get by with just 1 tablet daily and may or may not need the second tablet in the afternoon.    Return in about 1 year (around 3/19/2019) for Recheck.     History of Present Illness   This 27 y.o. old woman is here to follow-up ADHD.  Has carried the diagnosis since adolescence.  Has required medication since this time.  Previously on Adderall IR but has been on Adderall XR for several years.  She finds that it often keeps her awake at night if she takes the medication too late in the day.  It may be affecting her appetite as well.  She is interested in retrying intermediate release Adderall at a lower dose.  Otherwise feeling well.  No problems with anxiety or depression.  Uses birth control.    Review of Systems:  Otherwise, a comprehensive review of systems was negative except as noted.     Medications, Allergies and Problem List   Patient Active Problem List   Diagnosis     ADHD (attention deficit hyperactivity disorder)       She has a past surgical history that includes [place] tooth extraction.    No Known Allergies    Current Outpatient Prescriptions   Medication Sig Dispense Refill     MICROGESTIN FE 1/20, 28, 1 mg-20 mcg (21)/75 mg (7) per tablet        valACYclovir (VALTREX) 500 MG tablet TAKE 1 TABLET BY MOUTH TWICE DAILY 10 tablet 2     dextroamphetamine-amphetamine (ADDERALL) 10 mg Tab tablet Take 1 tablet by mouth 2 (two) times a day. 60 tablet 0     No current facility-administered medications for this visit.         Physical Exam   General Appearance:   Well-appearing young woman    /76 (Patient Site: Left Arm, Patient Position: Sitting, Cuff Size: Adult Regular)   "Pulse 68  Ht 5' 9\" (1.753 m)  Wt 135 lb (61.2 kg)  SpO2 100%  BMI 19.94 kg/m2    HEENT: Normal  Neck without thyromegaly or masses  Respiratory: Normal respiratory effort.  Lungs are clear with no rales or wheezes.  Heart: Regular rate and rhythm without murmurs, rubs, or gallops.    Neurologic: Grossly nonfocal  Skin: No cyanosis or pallor  Psych: Alert and oriented ×3, mood appropriate         Additional Information   Social History   Substance Use Topics     Smoking status: Never Smoker     Smokeless tobacco: Never Used     Alcohol use Yes      Comment: occasional              Lupillo Ellis MD  "

## 2021-06-20 NOTE — PROGRESS NOTES
Office Visit - Follow Up   Milka Blanca   28 y.o. female    Date of Visit: 8/23/2018    Chief Complaint   Patient presents with     Dizziness     in the last 24 hours         Assessment and Plan   1. Benign paroxysmal positional vertigo, bilateral  Abrupt onset of vertigo with nausea and slight tinnitus.  She has a history of mild vertigo 1 year ago but not as severe.  Symptoms worse with head movement and changes in position.  Neurologic exam completely normal.  No other worrisome neurologic symptoms to suggest more ominous etiology such as posterior circulation stroke or vertebral artery dissection.  She was getting chiropractic treatments for her neck pain but this seem to be helping.  Pain was present for the past 10 days preceding onset of vertigo.  I am recommending she try meclizine 25 mg 3 times daily as needed and will refer her to [facility] for consideration of Epley maneuver.    - Ambulatory referral to Physical Therapy    No Follow-up on file.     History of Present Illness   This 28 y.o. old woman with ADHD who is otherwise in good health is here with acute onset of vertigo.  Symptoms began abruptly last night and have recurred over the past 24 hours.  She describes room spinning sensation associated with nausea.  She has had neck pain and a headache for the past 10 days and has been getting treatments with a chiropractor.  Those symptoms were improving.  She has been taking a couple ibuprofen every day.  She denies any other neurologic symptoms such as vision loss or dysarthria.  Briefly felt some weakness in her left arm but quite vague and resolved.  Vertigo is worse with changes in position including movement of head.  She had some mild vertigo a year ago that resolved spontaneously.  She does notice some ringing in her right ear and slight change in hearing.    Review of Systems:  Otherwise, a comprehensive review of systems was negative except as noted.  "    Medications, Allergies and Problem List   Patient Active Problem List   Diagnosis     ADHD (attention deficit hyperactivity disorder)     Benign paroxysmal positional vertigo, bilateral       She has a past surgical history that includes [place] tooth extraction.    No Known Allergies    Current Outpatient Prescriptions   Medication Sig Dispense Refill     dextroamphetamine-amphetamine (ADDERALL) 10 mg Tab tablet Take 1 tablet by mouth 2 (two) times a day. 60 tablet 0     MICROGESTIN FE 1/20, 28, 1 mg-20 mcg (21)/75 mg (7) per tablet        valACYclovir (VALTREX) 500 MG tablet TAKE 1 TABLET BY MOUTH TWICE DAILY 10 tablet 2     meclizine (ANTIVERT) 25 mg tablet Take 1 tablet (25 mg total) by mouth 3 (three) times a day as needed for dizziness or nausea. 30 tablet 1     No current facility-administered medications for this visit.         Physical Exam   General Appearance:   Tearful young woman    /74 (Patient Site: Left Arm, Patient Position: Sitting, Cuff Size: Adult Regular)  Ht 5' 9\" (1.753 m)  Wt 128 lb (58.1 kg)  BMI 18.9 kg/m2    HEENT: Normal  Neck without carotid bruits.  No masses or lymphadenopathy/thyromegaly  Heart: Regular rate and rhythm without murmurs, rubs, or gallops.  No carotid bruits.  Extremities: No peripheral edema.  Neurologic: Cranial nerves intact.  Motor normal.  Normal coordination.         Additional Information   Social History   Substance Use Topics     Smoking status: Never Smoker     Smokeless tobacco: Never Used     Alcohol use Yes      Comment: occasional             Time: total time spent with the patient was 25 minutes of which >50% was spent in counseling and coordination of care     Lupillo Ellis MD  "

## 2021-06-21 NOTE — PROGRESS NOTES
"  Office Visit - Follow Up   Milka ALATORRE   28 y.o. female    Date of Visit: 11/19/2018    Chief Complaint   Patient presents with     Cough     Day 12 of cough, congestion, drainage, chills off and on, exhausted, just not going away, greenish mucus        Assessment and Plan   1. Bronchitis  Given her long duration of symptoms, the mucopurulent discharge and the fact that her lung exam did not sound completely normal this could be an early community-acquired pneumonia..  We will give her antibiotic azithromycin should suffice, codeine cough syrup.  Will defer chest x-ray for now . she was advised to a stay home from work.       History of Present Illness   This 28 y.o. old very pleasant otherwise healthy patient who has about a 2-week history of cough and upper respiratory tract symptoms with associated with fatigue, but no discernible fever or significant chills who can is now here because she continues to have symptoms and the mucus discharge is now purulent and she and her fatigue is worsened.  He has no shortness of breath, chest pain or severe sinus pain.  No diarrhea skin rash or joint pain    Review of Systems: A comprehensive review of systems was negative except as noted.     Medications, Allergies and Problem List   Reviewed, reconciled and updated     Physical Exam   General Appearance:       /66 (Patient Site: Right Arm, Patient Position: Sitting, Cuff Size: Adult Large)   Pulse 72   Ht 5' 9\" (1.753 m)   Wt 131 lb (59.4 kg)   SpO2 99%   BMI 19.35 kg/m      General appearance - alert, well appearing, and in no distress  Mental status - alert, oriented to person, place, and time  Neck - supple, no significant adenopathy  Lymphatics - no palpable lymphadenopathy, no hepatosplenomegaly  Chest - clear to auscultation, creps right mid lob area no percussion dullness no  rhonchi, symmetric air entry  Heart - normal rate, regular rhythm, normal S1, S2, no murmurs, rubs, clicks or " gallops  Abdomen - soft, nontender, nondistended, no masses or organomegaly    Extremities - peripheral pulses normal, no pedal edema, no clubbing or cyanosis  Skin - normal coloration and turgor, no rashes, no suspicious skin lesions noted                                                                                       Additional Information   Current Outpatient Medications   Medication Sig Dispense Refill     dextroamphetamine-amphetamine (ADDERALL) 10 mg Tab tablet Take 1 tablet by mouth 2 (two) times a day. 60 tablet 0     meclizine (ANTIVERT) 25 mg tablet Take 1 tablet (25 mg total) by mouth 3 (three) times a day as needed for dizziness or nausea. 30 tablet 1     MICROGESTIN FE 1/20, 28, 1 mg-20 mcg (21)/75 mg (7) per tablet        valACYclovir (VALTREX) 500 MG tablet TAKE 1 TABLET BY MOUTH TWICE DAILY 10 tablet 2     No current facility-administered medications for this visit.      No Known Allergies  Social History     Tobacco Use     Smoking status: Never Smoker     Smokeless tobacco: Never Used   Substance Use Topics     Alcohol use: Yes     Comment: occasional     Drug use: Not on file       Time: total time spent with the patient was 25 minutes of which >50% was spent in counseling and coordination of care     Kasie Shah MD

## 2021-06-23 NOTE — TELEPHONE ENCOUNTER
Controlled Substance Refill Request  Medication Name:   Requested Prescriptions     Pending Prescriptions Disp Refills     dextroamphetamine-amphetamine (ADDERALL) 10 mg Tab tablet 60 tablet 0     Sig: Take 1 tablet by mouth 2 (two) times a day.     Date Last Fill: 12/13/18  Pharmacy: [facility] [address]      Submit electronically to pharmacy  Controlled Substance Agreement Date Scanned:   Encounter-Level CSA Scan Date:    There are no encounter-level csa scan date.       Last office visit with prescriber/PCP: 8/23/2018 Lupillo Ellis MD OR same dept: 11/19/2018 Kasie Shah MD OR same specialty: 11/19/2018 Kasie Shah MD  Last physical: 11/4/2016 Last MTM visit: Visit date not found

## 2021-06-24 NOTE — TELEPHONE ENCOUNTER
Prescription Monitoring Program activity reviewed with no discrepancies noted.  Last fill per : 1/18/19 Qty 60 tablets for 30 days    Controlled Substance Agreement on file: No  Date: NA    Last office visit with provider:  8/23/2018 Lupillo Ellis MD    Please advise.

## 2021-06-24 NOTE — TELEPHONE ENCOUNTER
Patient is requesting this today as she is leaving to go out of town tomorrow and is out of this medication.

## 2021-06-24 NOTE — TELEPHONE ENCOUNTER
Controlled Substance Refill Request  Medication Name:   Requested Prescriptions     Pending Prescriptions Disp Refills     dextroamphetamine-amphetamine (ADDERALL) 10 mg Tab tablet 60 tablet 0     Sig: Take 1 tablet by mouth 2 (two) times a day.     Date Last Fill: 1/18/19  Pharmacy: [facility] [address]      Submit electronically to pharmacy  Controlled Substance Agreement Date Scanned:   Encounter-Level CSA Scan Date:    There are no encounter-level csa scan date.       Last office visit with prescriber/PCP: 8/23/2018 Lupillo Ellis MD OR same dept: 11/19/2018 Kasie Shah MD OR same specialty: 11/19/2018 Kasie Shah MD  Last physical: 11/4/2016 Last MTM visit: Visit date not found

## 2021-06-25 NOTE — TELEPHONE ENCOUNTER
Controlled Substance Refill Request  Medication Name:   Requested Prescriptions     Pending Prescriptions Disp Refills     dextroamphetamine-amphetamine (ADDERALL) 10 mg Tab tablet 60 tablet 0     Sig: Take 1 tablet by mouth 2 (two) times a day.     Date Last Fill: 2/20/19  Pharmacy: [facility]      Submit electronically to pharmacy  Controlled Substance Agreement Date Scanned:   Encounter-Level CSA Scan Date:    There are no encounter-level csa scan date.       Last office visit with prescriber/PCP: 8/23/2018 Lupillo Ellis MD OR same dept: 11/19/2018 Kasie Shah MD OR same specialty: 11/19/2018 Kasie Shah MD  Last physical: 11/4/2016 Last MTM visit: Visit date not found

## 2021-10-10 ENCOUNTER — HEALTH MAINTENANCE LETTER (OUTPATIENT)
Age: 31
End: 2021-10-10

## 2021-11-19 ENCOUNTER — VIRTUAL VISIT (OUTPATIENT)
Dept: INTERNAL MEDICINE | Facility: CLINIC | Age: 31
End: 2021-11-19
Payer: COMMERCIAL

## 2021-11-19 DIAGNOSIS — F90.0 ATTENTION DEFICIT HYPERACTIVITY DISORDER (ADHD), PREDOMINANTLY INATTENTIVE TYPE: Primary | ICD-10-CM

## 2021-11-19 PROBLEM — R87.610 ASCUS WITH POSITIVE HIGH RISK HPV CERVICAL: Status: RESOLVED | Noted: 2020-09-21 | Resolved: 2021-11-19

## 2021-11-19 PROBLEM — R87.810 ASCUS WITH POSITIVE HIGH RISK HPV CERVICAL: Status: RESOLVED | Noted: 2020-09-21 | Resolved: 2021-11-19

## 2021-11-19 PROBLEM — R87.810 ASCUS WITH POSITIVE HIGH RISK HPV CERVICAL: Status: ACTIVE | Noted: 2020-09-21

## 2021-11-19 PROBLEM — R87.610 ASCUS WITH POSITIVE HIGH RISK HPV CERVICAL: Status: ACTIVE | Noted: 2020-09-21

## 2021-11-19 PROBLEM — Z87.59 HISTORY OF GESTATIONAL HYPERTENSION: Status: ACTIVE | Noted: 2021-03-10

## 2021-11-19 PROBLEM — H81.13 BENIGN PAROXYSMAL POSITIONAL VERTIGO, BILATERAL: Status: RESOLVED | Noted: 2018-08-23 | Resolved: 2021-11-19

## 2021-11-19 PROBLEM — F90.9 ADHD (ATTENTION DEFICIT HYPERACTIVITY DISORDER): Status: ACTIVE | Noted: 2021-11-19

## 2021-11-19 PROBLEM — F90.9 ADHD (ATTENTION DEFICIT HYPERACTIVITY DISORDER): Status: RESOLVED | Noted: 2020-04-29 | Resolved: 2021-11-19

## 2021-11-19 PROBLEM — Z87.59 HISTORY OF GESTATIONAL HYPERTENSION: Status: RESOLVED | Noted: 2021-03-10 | Resolved: 2021-11-19

## 2021-11-19 PROCEDURE — 99213 OFFICE O/P EST LOW 20 MIN: CPT | Mod: 95 | Performed by: INTERNAL MEDICINE

## 2021-11-19 RX ORDER — DEXTROAMPHETAMINE SACCHARATE, AMPHETAMINE ASPARTATE, DEXTROAMPHETAMINE SULFATE AND AMPHETAMINE SULFATE 2.5; 2.5; 2.5; 2.5 MG/1; MG/1; MG/1; MG/1
TABLET ORAL 2 TIMES DAILY
COMMUNITY
Start: 2021-03-05 | End: 2021-11-19

## 2021-11-19 RX ORDER — DEXTROAMPHETAMINE SACCHARATE, AMPHETAMINE ASPARTATE, DEXTROAMPHETAMINE SULFATE AND AMPHETAMINE SULFATE 2.5; 2.5; 2.5; 2.5 MG/1; MG/1; MG/1; MG/1
10 TABLET ORAL 2 TIMES DAILY
Qty: 60 TABLET | Refills: 0 | Status: SHIPPED | OUTPATIENT
Start: 2021-11-19 | End: 2022-01-18

## 2021-11-19 RX ORDER — ASCORBIC ACID, CHOLECALCIFEROL, .ALPHA.-TOCOPHEROL ACETATE, DL-, PYRIDOXINE, FOLIC ACID, CYANOCOBALAMIN, CALCIUM, FERROUS FUMARATE, MAGNESIUM, DOCONEXENT 85; 200; 10; 25; 1; 12; 140; 27; 45; 300 [IU]/1; [IU]/1; [IU]/1; [IU]/1; MG/1; UG/1; MG/1; MG/1; MG/1; MG/1
CAPSULE, GELATIN COATED ORAL
COMMUNITY
Start: 2020-08-26 | End: 2023-12-19

## 2021-11-19 NOTE — PROGRESS NOTES
Milka is a 31 year old who is being evaluated via a billable video visit.      How would you like to obtain your AVS? MyChart  If the video visit is dropped, the invitation should be resent by: Send to e-mail at: [email or web address]  Will anyone else be joining your video visit? No      Video Start Time: 2:10 PM    Assessment & Plan     Attention deficit hyperactivity disorder (ADHD), predominantly inattentive type  ADHD managed with Adderall 10 mg twice daily.  She went off the medication during her pregnancy and she is no longer breast-feeding.  She is finding herself having difficulty staying focused and maintaining concentration especially on busier days now that she is trying to balance work and motherhood.  She is interested in resuming Adderall.  Prescription for 10 mg twice daily sent to her pharmacy.  This will likely last her for up to 60 days as she does not always need the second dose and she will often skip taking the medication on weekend days.  Using medication appropriately and tolerating without side effects.      - amphetamine-dextroamphetamine (ADDERALL) 10 MG tablet; Take 1 tablet (10 mg) by mouth 2 times daily                 Return in about 1 year (around 11/19/2022) for Follow up.    Lupillo Ellis MD  [facility]   Milka is a 31 year old who presents for the following health issues video visit was completed today to follow-up ADHD and use of Adderall.    History of Present Illness       She eats 2-3 servings of fruits and vegetables daily.She consumes 1 sweetened beverage(s) daily.   She is taking medications regularly.               Objective           Vitals:  No vitals were obtained today due to virtual visit.    Physical Exam   Well-appearing young woman        Video-Visit Details    Type of service:  Video Visit    Video End Time:2:18 PM    Originating Location (pt. Location): Home    Distant Location (provider location):   [facility]     Platform used for Video Visit: Doxim[employer]    Answers for HPI/ROS submitted by the patient on 11/19/2021  How many servings of fruits and vegetables do you eat daily?: 2-3  On average, how many sweetened beverages do you drink each day (Examples: soda, juice, sweet tea, etc.  Do NOT count diet or artificially sweetened beverages)?: 1  How many days per week do you miss taking your medication?: 0

## 2022-01-18 ENCOUNTER — MYC REFILL (OUTPATIENT)
Dept: INTERNAL MEDICINE | Facility: CLINIC | Age: 32
End: 2022-01-18
Payer: COMMERCIAL

## 2022-01-18 DIAGNOSIS — F90.0 ATTENTION DEFICIT HYPERACTIVITY DISORDER (ADHD), PREDOMINANTLY INATTENTIVE TYPE: ICD-10-CM

## 2022-01-20 RX ORDER — DEXTROAMPHETAMINE SACCHARATE, AMPHETAMINE ASPARTATE, DEXTROAMPHETAMINE SULFATE AND AMPHETAMINE SULFATE 2.5; 2.5; 2.5; 2.5 MG/1; MG/1; MG/1; MG/1
10 TABLET ORAL 2 TIMES DAILY
Qty: 60 TABLET | Refills: 0 | Status: SHIPPED | OUTPATIENT
Start: 2022-01-20 | End: 2022-02-15

## 2022-01-20 NOTE — TELEPHONE ENCOUNTER
Routing refill request to provider for review/approval because:  Controlled substance request    Last Written Prescription Date:  11/19/21  Last Fill Quantity: 60,  # refills: 0   Last office visit provider:  11/19/21     Requested Prescriptions   Pending Prescriptions Disp Refills     amphetamine-dextroamphetamine (ADDERALL) 10 MG tablet 60 tablet 0     Sig: Take 1 tablet (10 mg) by mouth 2 times daily       There is no refill protocol information for this order          Eleazar Pino RN 01/20/22 2:48 PM

## 2022-01-30 ENCOUNTER — HEALTH MAINTENANCE LETTER (OUTPATIENT)
Age: 32
End: 2022-01-30

## 2022-02-15 ENCOUNTER — OFFICE VISIT (OUTPATIENT)
Dept: INTERNAL MEDICINE | Facility: CLINIC | Age: 32
End: 2022-02-15
Payer: COMMERCIAL

## 2022-02-15 VITALS
OXYGEN SATURATION: 100 % | BODY MASS INDEX: 20.78 KG/M2 | DIASTOLIC BLOOD PRESSURE: 68 MMHG | TEMPERATURE: 98.1 F | HEART RATE: 73 BPM | SYSTOLIC BLOOD PRESSURE: 102 MMHG | WEIGHT: 140.7 LBS

## 2022-02-15 DIAGNOSIS — R51.9 NONINTRACTABLE HEADACHE, UNSPECIFIED CHRONICITY PATTERN, UNSPECIFIED HEADACHE TYPE: ICD-10-CM

## 2022-02-15 DIAGNOSIS — R53.83 FATIGUE, UNSPECIFIED TYPE: Primary | ICD-10-CM

## 2022-02-15 DIAGNOSIS — R68.83 CHILLS: ICD-10-CM

## 2022-02-15 DIAGNOSIS — E05.90 HYPERTHYROIDISM: ICD-10-CM

## 2022-02-15 PROBLEM — C07: Status: ACTIVE | Noted: 2022-02-15

## 2022-02-15 PROBLEM — O09.899 SUPERVISION OF OTHER HIGH RISK PREGNANCY, ANTEPARTUM, UNSPECIFIED TRIMESTER: Status: RESOLVED | Noted: 2022-02-14 | Resolved: 2022-02-15

## 2022-02-15 PROBLEM — O09.899 SUPERVISION OF OTHER HIGH RISK PREGNANCY, ANTEPARTUM, UNSPECIFIED TRIMESTER: Status: ACTIVE | Noted: 2022-02-14

## 2022-02-15 LAB
ALBUMIN SERPL-MCNC: 3.9 G/DL (ref 3.5–5)
ALBUMIN UR-MCNC: NEGATIVE MG/DL
ALP SERPL-CCNC: 45 U/L (ref 45–120)
ALT SERPL W P-5'-P-CCNC: 11 U/L (ref 0–45)
ANION GAP SERPL CALCULATED.3IONS-SCNC: 8 MMOL/L (ref 5–18)
APPEARANCE UR: CLEAR
AST SERPL W P-5'-P-CCNC: 13 U/L (ref 0–40)
BILIRUB SERPL-MCNC: 0.4 MG/DL (ref 0–1)
BILIRUB UR QL STRIP: NEGATIVE
BUN SERPL-MCNC: 7 MG/DL (ref 8–22)
CALCIUM SERPL-MCNC: 9.2 MG/DL (ref 8.5–10.5)
CHLORIDE BLD-SCNC: 104 MMOL/L (ref 98–107)
CO2 SERPL-SCNC: 23 MMOL/L (ref 22–31)
COLOR UR AUTO: YELLOW
CREAT SERPL-MCNC: 0.6 MG/DL (ref 0.6–1.1)
ERYTHROCYTE [DISTWIDTH] IN BLOOD BY AUTOMATED COUNT: 11.8 % (ref 10–15)
GFR SERPL CREATININE-BSD FRML MDRD: >90 ML/MIN/1.73M2
GLUCOSE BLD-MCNC: 89 MG/DL (ref 70–125)
GLUCOSE UR STRIP-MCNC: NEGATIVE MG/DL
HCT VFR BLD AUTO: 35.2 % (ref 35–47)
HGB BLD-MCNC: 12.4 G/DL (ref 11.7–15.7)
HGB UR QL STRIP: NEGATIVE
KETONES UR STRIP-MCNC: NEGATIVE MG/DL
LEUKOCYTE ESTERASE UR QL STRIP: NEGATIVE
MCH RBC QN AUTO: 32 PG (ref 26.5–33)
MCHC RBC AUTO-ENTMCNC: 35.2 G/DL (ref 31.5–36.5)
MCV RBC AUTO: 91 FL (ref 78–100)
NITRATE UR QL: NEGATIVE
PH UR STRIP: 7 [PH] (ref 5–8)
PLATELET # BLD AUTO: 260 10E3/UL (ref 150–450)
POTASSIUM BLD-SCNC: 4.3 MMOL/L (ref 3.5–5)
PROT SERPL-MCNC: 6.7 G/DL (ref 6–8)
RBC # BLD AUTO: 3.88 10E6/UL (ref 3.8–5.2)
SODIUM SERPL-SCNC: 135 MMOL/L (ref 136–145)
SP GR UR STRIP: 1.02 (ref 1–1.03)
TSH SERPL DL<=0.005 MIU/L-ACNC: 0.01 UIU/ML (ref 0.3–5)
UROBILINOGEN UR STRIP-ACNC: 0.2 E.U./DL
WBC # BLD AUTO: 6.9 10E3/UL (ref 4–11)

## 2022-02-15 PROCEDURE — 84443 ASSAY THYROID STIM HORMONE: CPT | Performed by: INTERNAL MEDICINE

## 2022-02-15 PROCEDURE — 85027 COMPLETE CBC AUTOMATED: CPT | Performed by: INTERNAL MEDICINE

## 2022-02-15 PROCEDURE — 81003 URINALYSIS AUTO W/O SCOPE: CPT | Performed by: INTERNAL MEDICINE

## 2022-02-15 PROCEDURE — 80053 COMPREHEN METABOLIC PANEL: CPT | Performed by: INTERNAL MEDICINE

## 2022-02-15 PROCEDURE — 36415 COLL VENOUS BLD VENIPUNCTURE: CPT | Performed by: INTERNAL MEDICINE

## 2022-02-15 PROCEDURE — 99214 OFFICE O/P EST MOD 30 MIN: CPT | Performed by: INTERNAL MEDICINE

## 2022-02-15 RX ORDER — METOCLOPRAMIDE 10 MG/1
10 TABLET ORAL EVERY 6 HOURS PRN
COMMUNITY
Start: 2022-02-01 | End: 2023-12-19

## 2022-02-15 NOTE — PROGRESS NOTES
Assessment & Plan     Fatigue, unspecified type with chills and dull headache  31-year-old woman who is approximately 8 weeks into her second pregnancy.  Here not feeling well including low energy with fatigue, nonspecific chills and dull headache.  Her exam is unremarkable.  Blood pressure well controlled.  We discussed that these could be symptoms related to her pregnancy although she does not recall having any of this during her first pregnancy.  We will proceed with metabolic evaluation making sure that she is not anemic or experiencing hyperglycemia or having any other problem with renal or liver function.  Will screen for thyroid disease and check urinalysis.  If headaches persist, consider need for imaging study.  - CBC with platelets  - Comprehensive metabolic panel (BMP + Alb, Alk Phos, ALT, AST, Total. Bili, TP)  - TSH  - UA Macro with Reflex to Micro and Culture - lab collect  - CBC with platelets  - Comprehensive metabolic panel (BMP + Alb, Alk Phos, ALT, AST, Total. Bili, TP)  - TSH  - UA Macro with Reflex to Micro and Culture - lab collect                Return in about 6 months (around 8/15/2022) for Routine preventive.    Lupillo Ellis MD  [facility]          Subjective       HPI 31-year-old woman who found out that she is 8 weeks pregnant here with fatigue, dizziness, dull headache, and chills.  See assessment and plan for details of visit    Current Outpatient Medications   Medication     metoclopramide (REGLAN) 10 MG tablet     Prenat w/o M-GA-Myjsmaj-FA-DHA (PNV-DHA) 27-0.6-0.4-300 MG CAPS     No current facility-administered medications for this visit.        Review of Systems    12 point ROS is negative other than what is described in assessment and plan and above      Objective    Vitals:    02/15/22 0858   BP: 102/68   BP Location: Right arm   Patient Position: Sitting   Cuff Size: Adult Regular   Pulse: 73   Temp: 98.1  F (36.7  C)   TempSrc: Oral    SpO2: 100%   Weight: 63.8 kg (140 lb 11.2 oz)        Physical Exam  Well-appearing young woman  HEENT normal  Neck without lymphadenopathy or masses  Lungs clear bilaterally without rales or wheezes  Heart regular rate and rhythm without murmur gallop  Abdomen soft without hepatosplenomegaly masses or tenderness  Extremities without edema  Neurologic intact  Skin without rash

## 2022-02-16 ENCOUNTER — NURSE TRIAGE (OUTPATIENT)
Dept: NURSING | Facility: CLINIC | Age: 32
End: 2022-02-16
Payer: COMMERCIAL

## 2022-02-16 DIAGNOSIS — E05.90 HYPERTHYROIDISM: Primary | ICD-10-CM

## 2022-02-16 NOTE — TELEPHONE ENCOUNTER
"Nurse Triage SBAR    Is this a 2nd Level Triage? YES, LICENSED PRACTITIONER REVIEW IS REQUIRED    Situation:   Caller is pt's mother (Ariane) together with pt.  Pt gives verbal permission for mother to participate in triage conversation as well.  Currently 9 weeks pregnant.  \"Feeling really horrible.\"  Had OV yesterday (2/15/22) with PCP for:  - fatigue  - chills  - constant dull headache  - rapid heartbeat  - dizziness    Had labs yesterday.  Abnormal Thyroid lab result needs interpretation.  Today -> \"Still feeling really awful.\"  \"Almost blacking out.\"  \"Not morning sickness.\"    Background:   Already in contact with OB provider today.  OB provider advised contacting PCP team or else ER.  Instructed pt's mother to check heart rate.  Current HR 63.  No chest pain.  \"Can feel heart pounding.\"  However no actual chest pain.      Assessment:   Pt currently stable.  Exhibiting clarity of speech and thought.  No faintness at this time.  Able to have oral intake.  Oral intake so far today:  - Pedialyte  - Smoothie  No vomiting.  No fever.  Chills, fatigue and palpitations appear correlated with abnormal TSH labs.    Recommendation:  Remain in semi-reclined position to avoid faintness.  May walk as needed.  Mother to accompany pt when using restroom.     Routing to provider for recommendation on next steps.  Protocol Recommended Disposition: ED or UC or Office with provider approval.    QUESTIONS:  Interpretation needed of abnormal TSH results.  1)  Can ED be avoided?  2)  Can meds be started?  -->Meds compatible with pregnancy?    Please advise ...  Best phone # for callback to pt [phone].    Thank you-    Korin CASTILLO [facility] Nurse Advisor     Reason for Disposition    Diarrhea is the main symptom    Patient sounds very sick or weak to the triager    Additional Information    Negative: Shock suspected (e.g., cold/pale/clammy skin, too weak to stand, low BP, rapid pulse)    Negative: Difficult to awaken or acting " confused (e.g., disoriented, slurred speech)    Negative: Fainted, and still feels dizzy afterwards    Negative: Severe difficulty breathing (e.g., struggling for each breath, speaks in single words)    Negative: Overdose (accidental or intentional) of medications    Negative: New neurologic deficit that is present now: * Weakness of the face, arm, or leg on one side of the body * Numbness of the face, arm, or leg on one side of the body * Loss of speech or garbled speech    Negative: Heart beating < 50 beats per minute OR > 140 beats per minute    Negative: Sounds like a life-threatening emergency to the triager    Negative: Chest pain    Negative: Rectal bleeding, bloody stool, or tarry-black stool    Negative: Vomiting is the main symptom    Negative: Still unconscious    Negative: Still feels dizzy or lightheaded    Negative: Difficult to awaken or acting confused (e.g., disoriented, slurred speech)    Negative: Difficulty breathing    Negative: Bluish (or gray) lips or face    Negative: Shock suspected (e.g., cold/pale/clammy skin, too weak to stand, low BP, rapid pulse)    Negative: Bleeding (e.g., vomiting blood, rectal bleeding or tarry stools, severe vaginal bleeding)    Negative: Chest pain    Negative: Extra heart beats or heart is beating fast (i.e., palpitations)    Negative: Heart beating < 50 beats per minute OR > 140 beats per minute    Negative: Fainted suddenly after medicine, allergic food or bee sting    Negative: Sounds like a life-threatening emergency to the triager    Negative: Has diabetes (diabetes mellitus) and fainting from low blood sugar (i.e., < 70 mg/dL or 3.9 mmol/L)    Negative: Seizure suspected (e.g., muscle jerking or shaking followed by confusion)    Negative: Heat exhaustion suspected (i.e., dehydration from heat exposure)    Negative: Fainted > 15 minutes ago and still looks pale (pale skin, pallor)    Negative: Fainted > 15 minutes ago and still feels weak or dizzy     Negative: History of heart problems or congestive heart failure    Negative: Occurred during exercise    Negative: Any head or face injury    Negative: Age > 50 years    Negative: Drinking very little and has signs of dehydration (e.g., no urine > 12 hours, very dry mouth)    Negative: Fainted 2 times in one day    Protocols used: DIZZINESS-A-OH, DIAPVNHV-L-NP

## 2022-02-16 NOTE — TELEPHONE ENCOUNTER
I spoke with Milka and her mom.  She sounds stable at this time.  Heart is beating hard but not racing.  Felt lightheaded this morning but sounding more like orthostatic hypotension.  Needs to maintain better hydration.  Blood pressure was low and heart rate was only 72 yesterday.  I would be reluctant to start a beta-blocker.  Excellent news that she has an appointment with an endocrinologist tomorrow.  Will await their recommendations on further work-up and treatment strategy.  Awaiting T4 result.

## 2022-02-16 NOTE — TELEPHONE ENCOUNTER
Please call.  I am reviewing lab results this morning and I see her suppressed TSH.  This is consistent with hyperthyroidism.  I am adding on a T4 level and we will get her an urgent appointment with an endocrinologist as work-up and treatment of an overactive thyroid is complicated during pregnancy.

## 2022-02-16 NOTE — TELEPHONE ENCOUNTER
Patient notified. She does have an appointment tomorrow with endocrinology through [facility] from a referral that was place by her OB/GYN doctor. They will send a [employer] message after she sees the endocrinologist so Dr. Ellis can look at the records in care everywhere.  Ariane Moran CMA ............... 11:29 AM, 02/16/22

## 2022-09-24 ENCOUNTER — HEALTH MAINTENANCE LETTER (OUTPATIENT)
Age: 32
End: 2022-09-24

## 2023-05-08 ENCOUNTER — HEALTH MAINTENANCE LETTER (OUTPATIENT)
Age: 33
End: 2023-05-08

## 2023-12-19 ENCOUNTER — VIRTUAL VISIT (OUTPATIENT)
Dept: FAMILY MEDICINE | Facility: CLINIC | Age: 33
End: 2023-12-19
Payer: COMMERCIAL

## 2023-12-19 DIAGNOSIS — J20.9 ACUTE BRONCHITIS, UNSPECIFIED ORGANISM: Primary | ICD-10-CM

## 2023-12-19 PROCEDURE — 99213 OFFICE O/P EST LOW 20 MIN: CPT | Mod: 95 | Performed by: PHYSICIAN ASSISTANT

## 2023-12-19 RX ORDER — BENZONATATE 200 MG/1
200 CAPSULE ORAL 3 TIMES DAILY PRN
Qty: 30 CAPSULE | Refills: 0 | Status: SHIPPED | OUTPATIENT
Start: 2023-12-19 | End: 2024-01-18

## 2023-12-19 NOTE — PROGRESS NOTES
"Milka is a 33 year old who is being evaluated via a billable video visit.      How would you like to obtain your AVS? MyChart  If the video visit is dropped, the invitation should be resent by: Text to cell phone: [phone]  Will anyone else be joining your video visit? No          Assessment & Plan     Acute bronchitis, unspecified organism  The patient is advised to push fluids, rest, gargle warm salt water, use vaporizer or mist needed , and Return office visit if symptoms persist or worsen.    - benzonatate (TESSALON) 200 MG capsule; Take 1 capsule (200 mg) by mouth 3 times daily as needed for cough                 Ramona Ann Aaseby-Aguilera, PA-C [facility]    Elida Jarquin is a 33 year old, presenting for the following health issues:  URI         No data to display            Has been sick for over 10 days and no improvement. Negative covid test.   Has a little chest tightness. No fever or SOB  HPI     Acute Illness  Acute illness concerns: muscle aches, chest congestion  Onset/Duration: 10 days  Symptoms:  Fever: No  Chills/Sweats: YES- chills  Headache (location?): YES  Sinus Pressure: No  Conjunctivitis:  No  Ear Pain: YES: feel plugged  Rhinorrhea: No  Congestion: YES- chest  Sore Throat: No  Cough: YES-productive of yellow sputum, waxing and waning over time  Wheeze: No  Decreased Appetite: YES  Nausea: No  Vomiting: No  Diarrhea: YES  Dysuria/Freq.: No  Dysuria or Hematuria: No  Fatigue/Achiness: YES- body aches, fatigue  Sick/Strep Exposure: YES  Therapies tried and outcome: tylenol, motrin        Review of Systems   Constitutional, HEENT, cardiovascular, pulmonary, gi and gu systems are negative, except as otherwise noted.      Objective    Vitals - Patient Reported  Weight (Patient Reported): 56.7 kg (125 lb)  Height (Patient Reported): 175.3 cm (5' 9\")  BMI (Based on Pt Reported Ht/Wt): 18.46      Vitals:  No vitals were obtained today due to virtual " visit.    Physical Exam   GENERAL: Healthy, alert and no distress  EYES: Eyes grossly normal to inspection.  No discharge or erythema, or obvious scleral/conjunctival abnormalities.  RESP: No audible wheeze, cough, or visible cyanosis.  No visible retractions or increased work of breathing.    SKIN: Visible skin clear. No significant rash, abnormal pigmentation or lesions.  NEURO: Cranial nerves grossly intact.  Mentation and speech appropriate for age.  PSYCH: Mentation appears normal, affect normal/bright, judgement and insight intact, normal speech and appearance well-groomed.                Video-Visit Details    Type of service:  Video Visit     Originating Location (pt. Location): Home    Distant Location (provider location):  Off-site  Platform used for Video Visit: Destiny

## 2024-01-18 ENCOUNTER — VIRTUAL VISIT (OUTPATIENT)
Dept: INTERNAL MEDICINE | Facility: CLINIC | Age: 34
End: 2024-01-18
Payer: COMMERCIAL

## 2024-01-18 DIAGNOSIS — F90.0 ATTENTION DEFICIT HYPERACTIVITY DISORDER (ADHD), PREDOMINANTLY INATTENTIVE TYPE: Primary | ICD-10-CM

## 2024-01-18 PROCEDURE — 99214 OFFICE O/P EST MOD 30 MIN: CPT | Mod: 95 | Performed by: INTERNAL MEDICINE

## 2024-01-18 RX ORDER — DEXTROAMPHETAMINE SACCHARATE, AMPHETAMINE ASPARTATE, DEXTROAMPHETAMINE SULFATE AND AMPHETAMINE SULFATE 2.5; 2.5; 2.5; 2.5 MG/1; MG/1; MG/1; MG/1
10 TABLET ORAL DAILY
Qty: 30 TABLET | Refills: 0 | Status: SHIPPED | OUTPATIENT
Start: 2024-01-18 | End: 2024-02-20

## 2024-01-18 NOTE — PROGRESS NOTES
Milka is a 33 year old who is being evaluated via a billable video visit.      How would you like to obtain your AVS? MyChart  If the video visit is dropped, the invitation should be resent by: Text to cell phone: [phone]  Will anyone else be joining your video visit? No          Assessment & Plan     Attention deficit hyperactivity disorder (ADHD), predominantly inattentive type  33-year-old woman with history of ADHD.  Previously was taking Adderall immediate release 10 mg but has been off the medication during her recent pregnancy and postpartum period.  However, her best attempts to manage her ADHD symptoms without medication, she is having days where she is having difficulty focusing and maintaining concentration.  She is interested in restarting medication to use on a daily as needed basis.  Extended release Adderall was not well-tolerated causing some issues with insomnia but she has done fine with release.  Will get her prescription for Adderall 10 mg to use daily as needed to help manage her symptoms.  We discussed potential side effects to monitor including insomnia, palpitations, unexpected weight loss or appetite suppression.  Should she develop any of these she is to contact me and we should reassess her for other treatment options.  However, her past experience has been quite good with this dose of the medication and I expect it to be very helpful.  - amphetamine-dextroamphetamine (ADDERALL) 10 MG tablet; Take 1 tablet (10 mg) by mouth daily        Follow-up annually    Subjective   Milka is a 33 year old, presenting for the following health issues: Discussed worsening ADHD symptoms and treatment options during today's video visit.  See assessment and plan for details.  Follow Up (ADHD, wants to resume adderall after giving birth)      1/18/2024     5:06 PM   Additional Questions   Roomed by Gail BHANDARI       Objective           Vitals:  No vitals were obtained today due to virtual  visit.    Physical Exam   Pleasant well-appearing young woman    Type of service:  Video Visit     Originating Location (pt. Location): Home    Distant Location (provider location):  On-site  Platform used for Video Visit: Destiny  Signed Electronically by: Lupillo Ellis MD    Answers submitted by the patient for this visit:  General Questionnaire (Submitted on 1/18/2024)  Chief Complaint: Chronic problems general questions HPI Form  What is the reason for your visit today? : Adderal Rx check up and refill after many years off  How many servings of fruits and vegetables do you eat daily?: 4 or more  On average, how many sweetened beverages do you drink each day (Examples: soda, juice, sweet tea, etc.  Do NOT count diet or artificially sweetened beverages)?: 0  How many minutes a day do you exercise enough to make your heart beat faster?: 30 to 60  How many days a week do you exercise enough to make your heart beat faster?: 4  How many days per week do you miss taking your medication?: 0

## 2024-02-20 ENCOUNTER — MYC REFILL (OUTPATIENT)
Dept: INTERNAL MEDICINE | Facility: CLINIC | Age: 34
End: 2024-02-20
Payer: COMMERCIAL

## 2024-02-20 DIAGNOSIS — F90.0 ATTENTION DEFICIT HYPERACTIVITY DISORDER (ADHD), PREDOMINANTLY INATTENTIVE TYPE: ICD-10-CM

## 2024-02-20 RX ORDER — DEXTROAMPHETAMINE SACCHARATE, AMPHETAMINE ASPARTATE, DEXTROAMPHETAMINE SULFATE AND AMPHETAMINE SULFATE 2.5; 2.5; 2.5; 2.5 MG/1; MG/1; MG/1; MG/1
10 TABLET ORAL DAILY
Qty: 30 TABLET | Refills: 0 | Status: SHIPPED | OUTPATIENT
Start: 2024-02-20 | End: 2024-03-19

## 2024-03-19 ENCOUNTER — MYC REFILL (OUTPATIENT)
Dept: INTERNAL MEDICINE | Facility: CLINIC | Age: 34
End: 2024-03-19
Payer: COMMERCIAL

## 2024-03-19 DIAGNOSIS — F90.0 ATTENTION DEFICIT HYPERACTIVITY DISORDER (ADHD), PREDOMINANTLY INATTENTIVE TYPE: ICD-10-CM

## 2024-03-19 RX ORDER — DEXTROAMPHETAMINE SACCHARATE, AMPHETAMINE ASPARTATE, DEXTROAMPHETAMINE SULFATE AND AMPHETAMINE SULFATE 2.5; 2.5; 2.5; 2.5 MG/1; MG/1; MG/1; MG/1
10 TABLET ORAL DAILY
Qty: 30 TABLET | Refills: 0 | Status: SHIPPED | OUTPATIENT
Start: 2024-03-19 | End: 2024-04-16

## 2024-04-15 ENCOUNTER — VIRTUAL VISIT (OUTPATIENT)
Dept: URGENT CARE | Facility: CLINIC | Age: 34
End: 2024-04-15
Payer: COMMERCIAL

## 2024-04-15 DIAGNOSIS — J01.90 ACUTE SINUSITIS TREATED WITH ANTIBIOTICS IN THE PAST 60 DAYS: Primary | ICD-10-CM

## 2024-04-15 PROCEDURE — 99213 OFFICE O/P EST LOW 20 MIN: CPT | Mod: 95

## 2024-04-15 NOTE — PROGRESS NOTES
Not pregnant or breastfeeding    Subjective   HPI    Day 9 of symptoms   Started with a common cold but worse  Having eye pressure both eyes and cheekbones  A lot of drainage and congestion  A lot of phelgm   Cough congestion  Both ears are plugged  No chest pain or shortness of breath    5 days ago both eyes were crusted shut and had discharge on both eyes. Did a virtual visit and got eye drops and the discharge improved and only minimally improved  No blurring of vision no photophobia no eye pain no feeling of foreign body no history of eye trauma, No contact lens wearer      Patient Active Problem List   Diagnosis    ADHD (attention deficit hyperactivity disorder)    Fatigue    Primary mucoepidermoid carcinoma of parotid gland (H)     Past Surgical History:   Procedure Laterality Date    PAROTIDECTOMY Left     2017    WISDOM TOOTH EXTRACTION         Social History     Tobacco Use    Smoking status: Never     Passive exposure: Never    Smokeless tobacco: Never   Substance Use Topics    Alcohol use: Yes     Comment: socially     Family History   Problem Relation Age of Onset    No Known Problems Mother         BRCA neg, family history Breast Ca           Reviewed and updated as needed this visit by Provider    Allergies  Meds               Review of Systems   Constitutional, HEENT, cardiovascular, pulmonary, gi and gu systems are negative, except as otherwise noted.       Objective   Reported vitals:  There were no vitals taken for this visit.   healthy, alert, and no distress  Eyes:  No grossly visible conjunctival or corneal foreign body, no periorbital swelling or cellulitis or pain with extraocular muscle movement. Mild erythema bilateral conjunctiva  PSYCH: Alert and oriented times 3; coherent speech, normal   rate and volume, able to articulate logical thoughts, able   to abstract reason, no tangential thoughts, no hallucinations   or delusions  Her affect is normal  RESP: No cough, no audible wheezing,  able to talk in full sentences  Additional exam:  none  Remainder of exam unable to be completed due to telephone visits    Diagnostic Test Results:  Labs reviewed in Epic        Assessment/Plan:      ICD-10-CM    1. Acute sinusitis treated with antibiotics in the past 60 days  J01.90 amoxicillin-clavulanate (AUGMENTIN) 875-125 MG tablet        Prescribed with augmentin  Side effects discussed warned about GI side effects and risk of cdiff.  Supportive treatment   If eye symptoms persist recommend eye clinic evaluation  For conjunctivitis: if with any worsening symptoms, pain, photophobia, worsening redness, swelling, feeling of foreign body go to ER or see your eye doctor    Joined the call at 4/15/2024, 9:31:32 am.  Left the call at 4/15/2024, 9:36:15 am.  You were on the call for 4 minutes 42 seconds .  Video done via:  [employer]  Originating site: patient home   Provider location: provider home     Michell Calvo MD

## 2024-04-16 ENCOUNTER — MYC REFILL (OUTPATIENT)
Dept: INTERNAL MEDICINE | Facility: CLINIC | Age: 34
End: 2024-04-16
Payer: COMMERCIAL

## 2024-04-16 DIAGNOSIS — F90.0 ATTENTION DEFICIT HYPERACTIVITY DISORDER (ADHD), PREDOMINANTLY INATTENTIVE TYPE: ICD-10-CM

## 2024-04-16 RX ORDER — DEXTROAMPHETAMINE SACCHARATE, AMPHETAMINE ASPARTATE, DEXTROAMPHETAMINE SULFATE AND AMPHETAMINE SULFATE 2.5; 2.5; 2.5; 2.5 MG/1; MG/1; MG/1; MG/1
10 TABLET ORAL DAILY
Qty: 30 TABLET | Refills: 0 | Status: SHIPPED | OUTPATIENT
Start: 2024-04-16 | End: 2024-05-13

## 2024-05-13 ENCOUNTER — MYC REFILL (OUTPATIENT)
Dept: INTERNAL MEDICINE | Facility: CLINIC | Age: 34
End: 2024-05-13
Payer: COMMERCIAL

## 2024-05-13 DIAGNOSIS — F90.0 ATTENTION DEFICIT HYPERACTIVITY DISORDER (ADHD), PREDOMINANTLY INATTENTIVE TYPE: ICD-10-CM

## 2024-05-13 RX ORDER — DEXTROAMPHETAMINE SACCHARATE, AMPHETAMINE ASPARTATE, DEXTROAMPHETAMINE SULFATE AND AMPHETAMINE SULFATE 2.5; 2.5; 2.5; 2.5 MG/1; MG/1; MG/1; MG/1
10 TABLET ORAL DAILY
Qty: 30 TABLET | Refills: 0 | Status: SHIPPED | OUTPATIENT
Start: 2024-05-13 | End: 2024-06-07

## 2024-06-07 ENCOUNTER — MYC REFILL (OUTPATIENT)
Dept: INTERNAL MEDICINE | Facility: CLINIC | Age: 34
End: 2024-06-07
Payer: COMMERCIAL

## 2024-06-07 DIAGNOSIS — F90.0 ATTENTION DEFICIT HYPERACTIVITY DISORDER (ADHD), PREDOMINANTLY INATTENTIVE TYPE: ICD-10-CM

## 2024-06-07 RX ORDER — DEXTROAMPHETAMINE SACCHARATE, AMPHETAMINE ASPARTATE, DEXTROAMPHETAMINE SULFATE AND AMPHETAMINE SULFATE 2.5; 2.5; 2.5; 2.5 MG/1; MG/1; MG/1; MG/1
10 TABLET ORAL DAILY
Qty: 30 TABLET | Refills: 0 | Status: SHIPPED | OUTPATIENT
Start: 2024-06-07 | End: 2024-07-08

## 2024-07-08 ENCOUNTER — MYC REFILL (OUTPATIENT)
Dept: INTERNAL MEDICINE | Facility: CLINIC | Age: 34
End: 2024-07-08
Payer: COMMERCIAL

## 2024-07-08 DIAGNOSIS — F90.0 ATTENTION DEFICIT HYPERACTIVITY DISORDER (ADHD), PREDOMINANTLY INATTENTIVE TYPE: ICD-10-CM

## 2024-07-08 RX ORDER — DEXTROAMPHETAMINE SACCHARATE, AMPHETAMINE ASPARTATE, DEXTROAMPHETAMINE SULFATE AND AMPHETAMINE SULFATE 2.5; 2.5; 2.5; 2.5 MG/1; MG/1; MG/1; MG/1
10 TABLET ORAL DAILY
Qty: 30 TABLET | Refills: 0 | Status: SHIPPED | OUTPATIENT
Start: 2024-07-08 | End: 2024-07-29

## 2024-07-14 ENCOUNTER — HEALTH MAINTENANCE LETTER (OUTPATIENT)
Age: 34
End: 2024-07-14

## 2024-07-29 ENCOUNTER — MYC REFILL (OUTPATIENT)
Dept: INTERNAL MEDICINE | Facility: CLINIC | Age: 34
End: 2024-07-29
Payer: COMMERCIAL

## 2024-07-29 DIAGNOSIS — F90.0 ATTENTION DEFICIT HYPERACTIVITY DISORDER (ADHD), PREDOMINANTLY INATTENTIVE TYPE: ICD-10-CM

## 2024-07-30 RX ORDER — DEXTROAMPHETAMINE SACCHARATE, AMPHETAMINE ASPARTATE, DEXTROAMPHETAMINE SULFATE AND AMPHETAMINE SULFATE 2.5; 2.5; 2.5; 2.5 MG/1; MG/1; MG/1; MG/1
10 TABLET ORAL DAILY
Qty: 30 TABLET | Refills: 0 | Status: SHIPPED | OUTPATIENT
Start: 2024-07-30 | End: 2024-09-06

## 2024-09-06 ENCOUNTER — MYC REFILL (OUTPATIENT)
Dept: INTERNAL MEDICINE | Facility: CLINIC | Age: 34
End: 2024-09-06
Payer: COMMERCIAL

## 2024-09-06 DIAGNOSIS — F90.0 ATTENTION DEFICIT HYPERACTIVITY DISORDER (ADHD), PREDOMINANTLY INATTENTIVE TYPE: ICD-10-CM

## 2024-09-06 RX ORDER — DEXTROAMPHETAMINE SACCHARATE, AMPHETAMINE ASPARTATE, DEXTROAMPHETAMINE SULFATE AND AMPHETAMINE SULFATE 2.5; 2.5; 2.5; 2.5 MG/1; MG/1; MG/1; MG/1
10 TABLET ORAL DAILY
Qty: 30 TABLET | Refills: 0 | Status: SHIPPED | OUTPATIENT
Start: 2024-09-06 | End: 2024-10-06

## 2024-10-06 ENCOUNTER — MYC REFILL (OUTPATIENT)
Dept: INTERNAL MEDICINE | Facility: CLINIC | Age: 34
End: 2024-10-06
Payer: COMMERCIAL

## 2024-10-06 DIAGNOSIS — F90.0 ATTENTION DEFICIT HYPERACTIVITY DISORDER (ADHD), PREDOMINANTLY INATTENTIVE TYPE: ICD-10-CM

## 2024-10-07 RX ORDER — DEXTROAMPHETAMINE SACCHARATE, AMPHETAMINE ASPARTATE, DEXTROAMPHETAMINE SULFATE AND AMPHETAMINE SULFATE 2.5; 2.5; 2.5; 2.5 MG/1; MG/1; MG/1; MG/1
10 TABLET ORAL DAILY
Qty: 30 TABLET | Refills: 0 | Status: SHIPPED | OUTPATIENT
Start: 2024-10-07 | End: 2024-11-04

## 2024-11-04 ENCOUNTER — MYC REFILL (OUTPATIENT)
Dept: INTERNAL MEDICINE | Facility: CLINIC | Age: 34
End: 2024-11-04
Payer: COMMERCIAL

## 2024-11-04 DIAGNOSIS — F90.0 ATTENTION DEFICIT HYPERACTIVITY DISORDER (ADHD), PREDOMINANTLY INATTENTIVE TYPE: ICD-10-CM

## 2024-11-04 RX ORDER — DEXTROAMPHETAMINE SACCHARATE, AMPHETAMINE ASPARTATE, DEXTROAMPHETAMINE SULFATE AND AMPHETAMINE SULFATE 2.5; 2.5; 2.5; 2.5 MG/1; MG/1; MG/1; MG/1
10 TABLET ORAL DAILY
Qty: 30 TABLET | Refills: 0 | Status: SHIPPED | OUTPATIENT
Start: 2024-11-04

## 2024-12-02 ENCOUNTER — MYC REFILL (OUTPATIENT)
Dept: INTERNAL MEDICINE | Facility: CLINIC | Age: 34
End: 2024-12-02
Payer: COMMERCIAL

## 2024-12-02 DIAGNOSIS — F90.0 ATTENTION DEFICIT HYPERACTIVITY DISORDER (ADHD), PREDOMINANTLY INATTENTIVE TYPE: ICD-10-CM

## 2024-12-02 RX ORDER — DEXTROAMPHETAMINE SACCHARATE, AMPHETAMINE ASPARTATE, DEXTROAMPHETAMINE SULFATE AND AMPHETAMINE SULFATE 2.5; 2.5; 2.5; 2.5 MG/1; MG/1; MG/1; MG/1
10 TABLET ORAL DAILY
Qty: 30 TABLET | Refills: 0 | Status: SHIPPED | OUTPATIENT
Start: 2024-12-02

## 2025-01-23 ENCOUNTER — VIRTUAL VISIT (OUTPATIENT)
Dept: FAMILY MEDICINE | Facility: CLINIC | Age: 35
End: 2025-01-23
Payer: COMMERCIAL

## 2025-01-23 DIAGNOSIS — R50.9 FEVER, UNSPECIFIED FEVER CAUSE: ICD-10-CM

## 2025-01-23 DIAGNOSIS — Z20.828 EXPOSURE TO INFLUENZA: Primary | ICD-10-CM

## 2025-01-23 RX ORDER — OSELTAMIVIR PHOSPHATE 75 MG/1
75 CAPSULE ORAL 2 TIMES DAILY
Qty: 10 CAPSULE | Refills: 0 | Status: SHIPPED | OUTPATIENT
Start: 2025-01-23 | End: 2025-01-28

## 2025-01-23 ASSESSMENT — ENCOUNTER SYMPTOMS: FLU SYMPTOMS: 1

## 2025-01-23 NOTE — PROGRESS NOTES
Milka is a 34 year old who is being evaluated via a billable video visit.          Assessment & Plan   1. Exposure to influenza (Primary)  2. Fever, unspecified fever cause  Patient appears to have a viral type illness.  She had significant exposure to her  who had influenza last week.  Her symptoms started 24 hours ago.   will be traveling in the next few days, leaving her as a single parent at home.  She is hoping to feel better so she can get her tasks done.  We discussed testing for flu, but since she has symptoms consistent with flu and significant recent exposure, I think it would be reasonable to just treat her with Tamiflu today.  She is in agreement.  She had a negative COVID test at home this morning.  Prescription sent for Tamiflu as below.  Encouraged other symptomatic treatment.  If she is worse, she will contact us.  - oseltamivir (TAMIFLU) 75 MG capsule; Take 1 capsule (75 mg) by mouth 2 times daily for 5 days.  Dispense: 10 capsule; Refill: 0          Subjective   Milka is a 34 year old, presenting for the following health issues:  Flu Symptoms    Flu Symptoms    History of Present Illness       Reason for visit:  My  had the flu (tested positive last week) and now I believe I do. I have a fever, horrible hacking cough sore throat, head and body aches, and so much fatigue.  Symptom onset:  1-3 days ago  Symptoms include:  Hacking cough, very sore throat, head and body aches, chills, fatigue  Symptom intensity:  Severe  Symptom progression:  Worsening  Had these symptoms before:  Yes  Has tried/received treatment for these symptoms:  No  What makes it worse:  Standing up and walking around  What makes it better:  Laying down, throat lozenges, i'm rotating tylenol and advil but I still feel really crummy.   She is taking medications regularly.       X 24 hours, feels like hit by a train today  Covid neg today at home  Hacking cough, ST, achy, chills  Alt Tylenol and ibu        "   Objective    Vitals - Patient Reported  Weight (Patient Reported): 54.4 kg (120 lb)  Height (Patient Reported): 175.3 cm (5' 9\")  BMI (Based on Pt Reported Ht/Wt): 17.72        Physical Exam   GENERAL: alert and no distress  EYES: Eyes grossly normal to inspection.  No discharge or erythema, or obvious scleral/conjunctival abnormalities.  RESP: No audible wheeze, cough, or visible cyanosis.    SKIN: Visible skin clear. No significant rash, abnormal pigmentation or lesions.  NEURO: Cranial nerves grossly intact.  Mentation and speech appropriate for age.  PSYCH: Appropriate affect, tone, and pace of words          Video-Visit Details    Type of service:  Video Visit   Originating Location (pt. Location): Home    Distant Location (provider location):  On-site  Platform used for Video Visit: Destiny  Signed Electronically by: Angela Sommers PA-C    "

## 2025-02-04 DIAGNOSIS — F90.0 ATTENTION DEFICIT HYPERACTIVITY DISORDER (ADHD), PREDOMINANTLY INATTENTIVE TYPE: ICD-10-CM

## 2025-02-04 RX ORDER — DEXTROAMPHETAMINE SACCHARATE, AMPHETAMINE ASPARTATE, DEXTROAMPHETAMINE SULFATE AND AMPHETAMINE SULFATE 2.5; 2.5; 2.5; 2.5 MG/1; MG/1; MG/1; MG/1
10 TABLET ORAL DAILY
Qty: 30 TABLET | Refills: 0 | Status: SHIPPED | OUTPATIENT
Start: 2025-02-04

## 2025-02-21 PROBLEM — Z85.818 HISTORY OF MALIGNANT NEOPLASM OF PAROTID GLAND: Status: ACTIVE | Noted: 2025-02-21

## 2025-02-21 PROBLEM — C07: Status: RESOLVED | Noted: 2022-02-15 | Resolved: 2025-02-21

## 2025-04-02 ENCOUNTER — MYC REFILL (OUTPATIENT)
Dept: INTERNAL MEDICINE | Facility: CLINIC | Age: 35
End: 2025-04-02
Payer: COMMERCIAL

## 2025-04-02 DIAGNOSIS — F90.0 ATTENTION DEFICIT HYPERACTIVITY DISORDER (ADHD), PREDOMINANTLY INATTENTIVE TYPE: ICD-10-CM

## 2025-04-03 RX ORDER — DEXTROAMPHETAMINE SACCHARATE, AMPHETAMINE ASPARTATE, DEXTROAMPHETAMINE SULFATE AND AMPHETAMINE SULFATE 2.5; 2.5; 2.5; 2.5 MG/1; MG/1; MG/1; MG/1
TABLET ORAL
Qty: 45 TABLET | Refills: 0 | Status: SHIPPED | OUTPATIENT
Start: 2025-04-03

## 2025-04-15 ENCOUNTER — OFFICE VISIT (OUTPATIENT)
Dept: INTERNAL MEDICINE | Facility: CLINIC | Age: 35
End: 2025-04-15
Payer: COMMERCIAL

## 2025-04-15 VITALS
HEIGHT: 69 IN | DIASTOLIC BLOOD PRESSURE: 78 MMHG | OXYGEN SATURATION: 97 % | SYSTOLIC BLOOD PRESSURE: 104 MMHG | HEART RATE: 69 BPM | TEMPERATURE: 97.8 F | RESPIRATION RATE: 16 BRPM | WEIGHT: 128 LBS | BODY MASS INDEX: 18.96 KG/M2

## 2025-04-15 DIAGNOSIS — M79.89 MASS OF SOFT TISSUE OF UPPER ARM: Primary | ICD-10-CM

## 2025-04-15 DIAGNOSIS — Z13.220 LIPID SCREENING: ICD-10-CM

## 2025-04-15 DIAGNOSIS — R53.83 FATIGUE, UNSPECIFIED TYPE: ICD-10-CM

## 2025-04-15 LAB
ALBUMIN SERPL BCG-MCNC: 4.8 G/DL (ref 3.5–5.2)
ALP SERPL-CCNC: 58 U/L (ref 40–150)
ALT SERPL W P-5'-P-CCNC: 12 U/L (ref 0–50)
ANION GAP SERPL CALCULATED.3IONS-SCNC: 12 MMOL/L (ref 7–15)
AST SERPL W P-5'-P-CCNC: 17 U/L (ref 0–45)
BILIRUB SERPL-MCNC: 0.4 MG/DL
BUN SERPL-MCNC: 11.4 MG/DL (ref 6–20)
CALCIUM SERPL-MCNC: 9.5 MG/DL (ref 8.8–10.4)
CHLORIDE SERPL-SCNC: 102 MMOL/L (ref 98–107)
CHOLEST SERPL-MCNC: 176 MG/DL
CREAT SERPL-MCNC: 0.79 MG/DL (ref 0.51–0.95)
EGFRCR SERPLBLD CKD-EPI 2021: >90 ML/MIN/1.73M2
ERYTHROCYTE [DISTWIDTH] IN BLOOD BY AUTOMATED COUNT: 12.1 % (ref 10–15)
FASTING STATUS PATIENT QL REPORTED: NO
FASTING STATUS PATIENT QL REPORTED: NO
GLUCOSE SERPL-MCNC: 87 MG/DL (ref 70–99)
HCO3 SERPL-SCNC: 24 MMOL/L (ref 22–29)
HCT VFR BLD AUTO: 38.1 % (ref 35–47)
HDLC SERPL-MCNC: 54 MG/DL
HGB BLD-MCNC: 13.3 G/DL (ref 11.7–15.7)
LDLC SERPL CALC-MCNC: 94 MG/DL
MCH RBC QN AUTO: 32.2 PG (ref 26.5–33)
MCHC RBC AUTO-ENTMCNC: 34.9 G/DL (ref 31.5–36.5)
MCV RBC AUTO: 92 FL (ref 78–100)
NONHDLC SERPL-MCNC: 122 MG/DL
PLATELET # BLD AUTO: 251 10E3/UL (ref 150–450)
POTASSIUM SERPL-SCNC: 4.1 MMOL/L (ref 3.4–5.3)
PROT SERPL-MCNC: 7.8 G/DL (ref 6.4–8.3)
RBC # BLD AUTO: 4.13 10E6/UL (ref 3.8–5.2)
SODIUM SERPL-SCNC: 138 MMOL/L (ref 135–145)
TRIGL SERPL-MCNC: 141 MG/DL
TSH SERPL DL<=0.005 MIU/L-ACNC: 0.5 UIU/ML (ref 0.3–4.2)
WBC # BLD AUTO: 6.9 10E3/UL (ref 4–11)

## 2025-04-15 PROCEDURE — 3078F DIAST BP <80 MM HG: CPT | Performed by: INTERNAL MEDICINE

## 2025-04-15 PROCEDURE — 99213 OFFICE O/P EST LOW 20 MIN: CPT | Performed by: INTERNAL MEDICINE

## 2025-04-15 PROCEDURE — 80053 COMPREHEN METABOLIC PANEL: CPT | Performed by: INTERNAL MEDICINE

## 2025-04-15 PROCEDURE — 3074F SYST BP LT 130 MM HG: CPT | Performed by: INTERNAL MEDICINE

## 2025-04-15 PROCEDURE — 80061 LIPID PANEL: CPT | Performed by: INTERNAL MEDICINE

## 2025-04-15 PROCEDURE — 84443 ASSAY THYROID STIM HORMONE: CPT | Performed by: INTERNAL MEDICINE

## 2025-04-15 PROCEDURE — 36415 COLL VENOUS BLD VENIPUNCTURE: CPT | Performed by: INTERNAL MEDICINE

## 2025-04-15 PROCEDURE — 85027 COMPLETE CBC AUTOMATED: CPT | Performed by: INTERNAL MEDICINE

## 2025-04-15 NOTE — PROGRESS NOTES
"  Assessment & Plan     Mass of soft tissue of upper arm  34-year-old woman with new mass/lump on her left upper arm.  She first noticed this 2 weeks ago.  Nontender.  Exam shows a 1 cm soft rubbery mass left upper arm that is likely a lipoma and less likely a cyst.  We discussed that such findings are almost always benign.  Rarely can represent a more serious findings such as a soft tissue cancer.  It would be reasonable for her to monitor the mass for the next 4-6 weeks to see if it resolves which may occur if this is simply a cyst or hematoma.  However, if it increases in size, I would want her to be evaluated by general surgery.  She may also want to have it evaluated if it persists and is not resolving over the next 4-6 weeks.  Referral is placed.  - Adult Gen Surg  Referral; Future    Fatigue, unspecified type  She has felt more tired and metabolic studies are ordered to evaluate including CBC, CMP and TSH    Lipid screening            See Patient Instructions    Elida Jarquin is a 34 year old, presenting for the following health issues:  Mass (Left arm, X 1 month. No pain, swelling, redness) and Follow Up (Do labs for thyroid)      4/15/2025     8:52 AM   Additional Questions   Roomed by      Mass    History of Present Illness       Reason for visit:  Lump on arm  Symptom onset:  3-4 weeks ago   She is taking medications regularly.                  Review of Systems  See assessment and plan      Objective    /78 (BP Location: Right arm, Patient Position: Sitting, Cuff Size: Adult Regular)   Pulse 69   Temp 97.8  F (36.6  C) (Oral)   Resp 16   Ht 1.753 m (5' 9\")   Wt 58.1 kg (128 lb)   LMP 04/01/2025 (Approximate)   SpO2 97%   Breastfeeding No   BMI 18.90 kg/m    Body mass index is 18.9 kg/m .  Physical Exam   1 cm soft and rubbery mass left upper arm likely representing lipoma        Signed Electronically by: Lupillo Ellis MD    "

## 2025-04-30 ENCOUNTER — MYC REFILL (OUTPATIENT)
Dept: INTERNAL MEDICINE | Facility: CLINIC | Age: 35
End: 2025-04-30
Payer: COMMERCIAL

## 2025-04-30 DIAGNOSIS — F90.0 ATTENTION DEFICIT HYPERACTIVITY DISORDER (ADHD), PREDOMINANTLY INATTENTIVE TYPE: ICD-10-CM

## 2025-05-01 RX ORDER — DEXTROAMPHETAMINE SACCHARATE, AMPHETAMINE ASPARTATE, DEXTROAMPHETAMINE SULFATE AND AMPHETAMINE SULFATE 2.5; 2.5; 2.5; 2.5 MG/1; MG/1; MG/1; MG/1
TABLET ORAL
Qty: 45 TABLET | Refills: 0 | Status: SHIPPED | OUTPATIENT
Start: 2025-05-01

## 2025-05-29 ENCOUNTER — MYC REFILL (OUTPATIENT)
Dept: INTERNAL MEDICINE | Facility: CLINIC | Age: 35
End: 2025-05-29
Payer: COMMERCIAL

## 2025-05-29 DIAGNOSIS — F90.0 ATTENTION DEFICIT HYPERACTIVITY DISORDER (ADHD), PREDOMINANTLY INATTENTIVE TYPE: ICD-10-CM

## 2025-05-29 RX ORDER — DEXTROAMPHETAMINE SACCHARATE, AMPHETAMINE ASPARTATE, DEXTROAMPHETAMINE SULFATE AND AMPHETAMINE SULFATE 2.5; 2.5; 2.5; 2.5 MG/1; MG/1; MG/1; MG/1
TABLET ORAL
Qty: 45 TABLET | Refills: 0 | Status: SHIPPED | OUTPATIENT
Start: 2025-05-29

## 2025-06-25 ENCOUNTER — E-VISIT (OUTPATIENT)
Dept: URGENT CARE | Facility: CLINIC | Age: 35
End: 2025-06-25
Payer: COMMERCIAL

## 2025-06-25 DIAGNOSIS — J02.9 SORE THROAT: Primary | ICD-10-CM

## 2025-06-25 NOTE — PATIENT INSTRUCTIONS
Deakelley Jarquin,    After reviewing your responses, I would like you to come in for a swab to make sure we treat you correctly. This swab is to evaluate you for possible Strep Throat, and should be scheduled for today or tomorrow. Please use the Schedule Now button in [employer] to schedule your swab. Otherwise, click this link to schedule a lab only appointment.    Lab appointments are not available at most locations on the weekends. If no Lab Only appointment is available, you should be seen in any of our convenient Urgent Care Centers for an in person visit, which can be found on our website here.    You will receive instructions with your results in [employer] once they are available.     If your symptoms worsen, you develop difficulty breathing, difficulty with drinking enough to stay hydrated, difficulty swallowing your saliva or have fevers for more than 5 days, please contact your primary care provider for an appointment or visit an Urgent Care Center to be seen.      Thanks again for choosing us as your health care partner.   Lee Ann Nichols, CNP  Sore Throat: Care Instructions  Overview     Infection by bacteria or a virus causes most sore throats. Cigarette smoke, dry air, air pollution, allergies, and yelling can also cause a sore throat. Sore throats can be painful and annoying. Fortunately, most sore throats go away on their own. If you have a bacterial infection, your doctor may prescribe antibiotics.  Follow-up care is a key part of your treatment and safety. Be sure to make and go to all appointments, and call your doctor if you are having problems. It's also a good idea to know your test results and keep a list of the medicines you take.  How can you care for yourself at home?  If your doctor prescribed antibiotics, take them as directed. Do not stop taking them just because you feel better. You need to take the full course of antibiotics.  Gargle with warm salt water several times a day to help reduce  "swelling and relieve pain. Mix 1/2 teaspoon of salt in 1 cup of warm water.  Take an over-the-counter pain medicine, such as acetaminophen (Tylenol), ibuprofen (Advil, Motrin), or naproxen (Aleve). Read and follow all instructions on the label.  Be careful when taking over-the-counter cold or flu medicines and Tylenol at the same time. Many of these medicines have acetaminophen, which is Tylenol. Read the labels to make sure that you are not taking more than the recommended dose. Too much acetaminophen (Tylenol) can be harmful.  Drink plenty of fluids. Fluids may help soothe an irritated throat. Hot fluids, such as tea or soup, may help decrease throat pain.  Use over-the-counter throat lozenges to soothe pain. Regular cough drops or hard candy may also help. These should not be given to young children because of the risk of choking.  Do not smoke or allow others to smoke around you. If you need help quitting, talk to your doctor about stop-smoking programs and medicines. These can increase your chances of quitting for good.  Use a vaporizer or humidifier to add moisture to your bedroom. Follow the directions for cleaning the machine.  When should you call for help?   Call your doctor now or seek immediate medical care if:    You have trouble breathing.     Your sore throat gets much worse on one side.     You have new or worse trouble swallowing.     You have a new or higher fever.   Watch closely for changes in your health, and be sure to contact your doctor if you do not get better as expected.  Where can you learn more?  Go to https://[email or web address].net/patiented  Enter U420 in the search box to learn more about \"Sore Throat: Care Instructions.\"  Current as of: October 27, 2024  Content Version: 14.5    0241-0129 [facility] [employer].   Care instructions adapted under license by your healthcare professional. If you have questions about a medical condition or this instruction, always ask your healthcare " professional. [facility] [employer], [facility] disclaims any warranty or liability for your use of this information.

## 2025-06-26 ENCOUNTER — RESULTS FOLLOW-UP (OUTPATIENT)
Dept: URGENT CARE | Facility: CLINIC | Age: 35
End: 2025-06-26

## 2025-06-26 ENCOUNTER — MYC REFILL (OUTPATIENT)
Dept: INTERNAL MEDICINE | Facility: CLINIC | Age: 35
End: 2025-06-26

## 2025-06-26 ENCOUNTER — LAB (OUTPATIENT)
Dept: FAMILY MEDICINE | Facility: CLINIC | Age: 35
End: 2025-06-26
Attending: NURSE PRACTITIONER
Payer: COMMERCIAL

## 2025-06-26 DIAGNOSIS — F90.0 ATTENTION DEFICIT HYPERACTIVITY DISORDER (ADHD), PREDOMINANTLY INATTENTIVE TYPE: ICD-10-CM

## 2025-06-26 DIAGNOSIS — J02.9 SORE THROAT: ICD-10-CM

## 2025-06-26 LAB
DEPRECATED S PYO AG THROAT QL EIA: NEGATIVE
FLUAV RNA SPEC QL NAA+PROBE: NEGATIVE
FLUBV RNA RESP QL NAA+PROBE: NEGATIVE
RSV RNA SPEC NAA+PROBE: NEGATIVE
S PYO DNA THROAT QL NAA+PROBE: NOT DETECTED
SARS-COV-2 RNA RESP QL NAA+PROBE: NEGATIVE

## 2025-06-26 RX ORDER — DEXTROAMPHETAMINE SACCHARATE, AMPHETAMINE ASPARTATE, DEXTROAMPHETAMINE SULFATE AND AMPHETAMINE SULFATE 2.5; 2.5; 2.5; 2.5 MG/1; MG/1; MG/1; MG/1
TABLET ORAL
Qty: 45 TABLET | Refills: 0 | Status: SHIPPED | OUTPATIENT
Start: 2025-06-26

## 2025-07-19 ENCOUNTER — HEALTH MAINTENANCE LETTER (OUTPATIENT)
Age: 35
End: 2025-07-19

## 2025-07-28 ENCOUNTER — OFFICE VISIT (OUTPATIENT)
Dept: INTERNAL MEDICINE | Facility: CLINIC | Age: 35
End: 2025-07-28
Payer: COMMERCIAL

## 2025-07-28 ENCOUNTER — MYC MEDICAL ADVICE (OUTPATIENT)
Dept: INTERNAL MEDICINE | Facility: CLINIC | Age: 35
End: 2025-07-28

## 2025-07-28 ENCOUNTER — MYC REFILL (OUTPATIENT)
Dept: INTERNAL MEDICINE | Facility: CLINIC | Age: 35
End: 2025-07-28
Payer: COMMERCIAL

## 2025-07-28 VITALS
HEART RATE: 78 BPM | WEIGHT: 128 LBS | BODY MASS INDEX: 18.96 KG/M2 | OXYGEN SATURATION: 99 % | DIASTOLIC BLOOD PRESSURE: 80 MMHG | SYSTOLIC BLOOD PRESSURE: 110 MMHG | RESPIRATION RATE: 16 BRPM | TEMPERATURE: 97.8 F | HEIGHT: 69 IN

## 2025-07-28 DIAGNOSIS — F90.0 ATTENTION DEFICIT HYPERACTIVITY DISORDER (ADHD), PREDOMINANTLY INATTENTIVE TYPE: ICD-10-CM

## 2025-07-28 DIAGNOSIS — L03.011 PARONYCHIA OF FINGER OF RIGHT HAND: Primary | ICD-10-CM

## 2025-07-28 PROCEDURE — 3079F DIAST BP 80-89 MM HG: CPT | Performed by: INTERNAL MEDICINE

## 2025-07-28 PROCEDURE — 3074F SYST BP LT 130 MM HG: CPT | Performed by: INTERNAL MEDICINE

## 2025-07-28 PROCEDURE — 99213 OFFICE O/P EST LOW 20 MIN: CPT | Performed by: INTERNAL MEDICINE

## 2025-07-28 RX ORDER — DEXTROAMPHETAMINE SACCHARATE, AMPHETAMINE ASPARTATE, DEXTROAMPHETAMINE SULFATE AND AMPHETAMINE SULFATE 2.5; 2.5; 2.5; 2.5 MG/1; MG/1; MG/1; MG/1
TABLET ORAL
Qty: 45 TABLET | Refills: 0 | Status: SHIPPED | OUTPATIENT
Start: 2025-07-28

## 2025-07-28 RX ORDER — CEPHALEXIN 500 MG/1
500 CAPSULE ORAL 2 TIMES DAILY
Qty: 14 CAPSULE | Refills: 0 | Status: SHIPPED | OUTPATIENT
Start: 2025-07-28 | End: 2025-08-04

## 2025-07-28 NOTE — PROGRESS NOTES
"  Assessment & Plan     Paronychia of finger of right hand  35-year-old woman who recently had a hangnail on her right third finger and now has developed redness and swelling at the nailbed.  Only slightly tender.  Consistent with paronychial infection.  In addition to soaking in warm water or applying hot compresses, recommending short course of antibiotics with Keflex 500 mg twice daily for 7 days.  She eats yogurt daily and should continue providing natural probiotic to minimize risk of GI side effects  - cephALEXin (KEFLEX) 500 MG capsule; Take 1 capsule (500 mg) by mouth 2 times daily for 7 days.    Follow-up  Return if symptoms worsen or fail to improve.    Elida Jarquin is a 35 year old, presenting for the following health issues:  Pain (Right middle finger, painful to the touch, warm)      7/28/2025     5:24 PM   Additional Questions   Roomed by Shanice Wilson    History of Present Illness       Reason for visit:  Finger  Symptom onset:  1-3 days ago   She is taking medications regularly.                  Review of Systems  No fevers or chills      Objective    /80 (BP Location: Right arm, Patient Position: Sitting)   Pulse 78   Temp 97.8  F (36.6  C)   Resp 16   Ht 1.753 m (5' 9\")   Wt 58.1 kg (128 lb)   LMP 07/02/2025 (Approximate)   SpO2 99%   BMI 18.90 kg/m    Body mass index is 18.9 kg/m .  Physical Exam   Mild swelling with erythema adjacent to right third fingernail    Signed Electronically by: Lupillo Ellis MD    "

## 2025-07-28 NOTE — TELEPHONE ENCOUNTER
Outgoing call to patient, scheduled patient for appointment to see PCP at 5:40PM today (arrival time 5:20PM). Patient will call back if she needs to reschedule. No additional questions or concerns at this time.